# Patient Record
Sex: FEMALE | Race: WHITE | NOT HISPANIC OR LATINO
[De-identification: names, ages, dates, MRNs, and addresses within clinical notes are randomized per-mention and may not be internally consistent; named-entity substitution may affect disease eponyms.]

---

## 2017-01-12 ENCOUNTER — RECORDS - HEALTHEAST (OUTPATIENT)
Dept: ADMINISTRATIVE | Facility: OTHER | Age: 62
End: 2017-01-12

## 2018-05-22 ENCOUNTER — RECORDS - HEALTHEAST (OUTPATIENT)
Dept: ADMINISTRATIVE | Facility: OTHER | Age: 63
End: 2018-05-22

## 2019-03-25 ENCOUNTER — RECORDS - HEALTHEAST (OUTPATIENT)
Dept: ADMINISTRATIVE | Facility: OTHER | Age: 64
End: 2019-03-25

## 2019-04-05 ENCOUNTER — RECORDS - HEALTHEAST (OUTPATIENT)
Dept: ADMINISTRATIVE | Facility: OTHER | Age: 64
End: 2019-04-05

## 2019-09-16 ENCOUNTER — RECORDS - HEALTHEAST (OUTPATIENT)
Dept: ADMINISTRATIVE | Facility: OTHER | Age: 64
End: 2019-09-16

## 2019-09-16 LAB
CHOLEST SERPL-MCNC: 151 MG/DL (ref 0–199)
HDLC SERPL-MCNC: 51 MG/DL
LDLC SERPL CALC-MCNC: 73 MG/DL
NON HDL CHOL. (LDL+VLDL): 100 MG/DL
TRIGLYCERIDES (HISTORICAL CONVERSION): 135 MG/DL

## 2019-09-24 ENCOUNTER — RECORDS - HEALTHEAST (OUTPATIENT)
Dept: ADMINISTRATIVE | Facility: OTHER | Age: 64
End: 2019-09-24

## 2019-09-25 ENCOUNTER — AMBULATORY - HEALTHEAST (OUTPATIENT)
Dept: MULTI SPECIALTY CLINIC | Facility: CLINIC | Age: 64
End: 2019-09-25

## 2019-09-25 ENCOUNTER — RECORDS - HEALTHEAST (OUTPATIENT)
Dept: ADMINISTRATIVE | Facility: OTHER | Age: 64
End: 2019-09-25

## 2019-09-27 LAB — OCCULT BLOOD (FIT)_EXT (HISTORICAL CONVERSION): NEGATIVE

## 2019-10-18 ENCOUNTER — RECORDS - HEALTHEAST (OUTPATIENT)
Dept: ADMINISTRATIVE | Facility: OTHER | Age: 64
End: 2019-10-18

## 2019-10-23 ENCOUNTER — RECORDS - HEALTHEAST (OUTPATIENT)
Dept: ADMINISTRATIVE | Facility: OTHER | Age: 64
End: 2019-10-23

## 2019-10-23 LAB
HPV_EXT - HISTORICAL: NORMAL
PAP SMEAR - HIM PATIENT REPORTED: NORMAL

## 2019-10-24 ENCOUNTER — RECORDS - HEALTHEAST (OUTPATIENT)
Dept: ADMINISTRATIVE | Facility: OTHER | Age: 64
End: 2019-10-24

## 2019-11-04 ENCOUNTER — RECORDS - HEALTHEAST (OUTPATIENT)
Dept: ADMINISTRATIVE | Facility: OTHER | Age: 64
End: 2019-11-04

## 2019-12-02 ENCOUNTER — RECORDS - HEALTHEAST (OUTPATIENT)
Dept: ADMINISTRATIVE | Facility: OTHER | Age: 64
End: 2019-12-02

## 2019-12-19 ENCOUNTER — RECORDS - HEALTHEAST (OUTPATIENT)
Dept: ADMINISTRATIVE | Facility: OTHER | Age: 64
End: 2019-12-19

## 2019-12-22 ENCOUNTER — RECORDS - HEALTHEAST (OUTPATIENT)
Dept: ADMINISTRATIVE | Facility: OTHER | Age: 64
End: 2019-12-22

## 2020-01-02 ENCOUNTER — OFFICE VISIT - HEALTHEAST (OUTPATIENT)
Dept: FAMILY MEDICINE | Facility: CLINIC | Age: 65
End: 2020-01-02

## 2020-01-02 ENCOUNTER — COMMUNICATION - HEALTHEAST (OUTPATIENT)
Dept: TELEHEALTH | Facility: CLINIC | Age: 65
End: 2020-01-02

## 2020-01-02 DIAGNOSIS — I10 ESSENTIAL HYPERTENSION: ICD-10-CM

## 2020-01-02 DIAGNOSIS — G89.18 POSTOPERATIVE PAIN: ICD-10-CM

## 2020-01-02 DIAGNOSIS — E03.9 ACQUIRED HYPOTHYROIDISM: ICD-10-CM

## 2020-01-02 DIAGNOSIS — C34.12 MALIGNANT NEOPLASM OF UPPER LOBE OF LEFT LUNG (H): ICD-10-CM

## 2020-01-02 RX ORDER — HYDROCHLOROTHIAZIDE 25 MG/1
25 TABLET ORAL DAILY
Status: SHIPPED | COMMUNITY
Start: 2019-01-11

## 2020-01-02 RX ORDER — LEVOTHYROXINE SODIUM 125 UG/1
TABLET ORAL
Status: SHIPPED | COMMUNITY
Start: 2019-07-17

## 2020-01-02 RX ORDER — IPRATROPIUM BROMIDE 21 UG/1
2 SPRAY, METERED NASAL
Status: SHIPPED | COMMUNITY
Start: 2019-05-29

## 2020-01-02 RX ORDER — IBUPROFEN 200 MG
200-400 TABLET ORAL EVERY 8 HOURS PRN
Status: SHIPPED | COMMUNITY
Start: 2020-01-02

## 2020-01-02 ASSESSMENT — ANXIETY QUESTIONNAIRES
2. NOT BEING ABLE TO STOP OR CONTROL WORRYING: NOT AT ALL
1. FEELING NERVOUS, ANXIOUS, OR ON EDGE: NOT AT ALL
5. BEING SO RESTLESS THAT IT IS HARD TO SIT STILL: NOT AT ALL
4. TROUBLE RELAXING: NOT AT ALL
7. FEELING AFRAID AS IF SOMETHING AWFUL MIGHT HAPPEN: NOT AT ALL
GAD7 TOTAL SCORE: 0
6. BECOMING EASILY ANNOYED OR IRRITABLE: NOT AT ALL
3. WORRYING TOO MUCH ABOUT DIFFERENT THINGS: NOT AT ALL

## 2020-01-02 ASSESSMENT — PATIENT HEALTH QUESTIONNAIRE - PHQ9: SUM OF ALL RESPONSES TO PHQ QUESTIONS 1-9: 0

## 2020-01-02 ASSESSMENT — MIFFLIN-ST. JEOR: SCORE: 1336.14

## 2020-01-03 ENCOUNTER — COMMUNICATION - HEALTHEAST (OUTPATIENT)
Dept: ONCOLOGY | Facility: CLINIC | Age: 65
End: 2020-01-03

## 2020-01-13 ENCOUNTER — COMMUNICATION - HEALTHEAST (OUTPATIENT)
Dept: FAMILY MEDICINE | Facility: CLINIC | Age: 65
End: 2020-01-13

## 2020-01-13 DIAGNOSIS — C34.12 MALIGNANT NEOPLASM OF UPPER LOBE OF LEFT LUNG (H): ICD-10-CM

## 2020-01-15 ENCOUNTER — AMBULATORY - HEALTHEAST (OUTPATIENT)
Dept: ONCOLOGY | Facility: CLINIC | Age: 65
End: 2020-01-15

## 2020-01-15 ENCOUNTER — OFFICE VISIT - HEALTHEAST (OUTPATIENT)
Dept: ONCOLOGY | Facility: CLINIC | Age: 65
End: 2020-01-15

## 2020-01-15 DIAGNOSIS — J41.0 SIMPLE CHRONIC BRONCHITIS (H): ICD-10-CM

## 2020-01-15 DIAGNOSIS — C34.12 PRIMARY CANCER OF LEFT UPPER LOBE OF LUNG (H): ICD-10-CM

## 2020-01-15 DIAGNOSIS — Z71.6 ENCOUNTER FOR TOBACCO USE CESSATION COUNSELING: ICD-10-CM

## 2020-01-15 RX ORDER — CHLORAL HYDRATE 500 MG
2 CAPSULE ORAL DAILY
Status: SHIPPED | COMMUNITY
Start: 2020-01-15

## 2020-01-15 ASSESSMENT — MIFFLIN-ST. JEOR: SCORE: 1345.1

## 2020-01-16 ENCOUNTER — RECORDS - HEALTHEAST (OUTPATIENT)
Dept: HEALTH INFORMATION MANAGEMENT | Facility: CLINIC | Age: 65
End: 2020-01-16

## 2020-01-28 ENCOUNTER — AMBULATORY - HEALTHEAST (OUTPATIENT)
Dept: INTENSIVE CARE | Facility: CLINIC | Age: 65
End: 2020-01-28

## 2020-01-28 DIAGNOSIS — C34.12 PRIMARY CANCER OF LEFT UPPER LOBE OF LUNG (H): ICD-10-CM

## 2020-01-28 DIAGNOSIS — J41.0 SIMPLE CHRONIC BRONCHITIS (H): ICD-10-CM

## 2020-01-30 ENCOUNTER — OFFICE VISIT - HEALTHEAST (OUTPATIENT)
Dept: PULMONOLOGY | Facility: OTHER | Age: 65
End: 2020-01-30

## 2020-01-30 DIAGNOSIS — J44.9 COPD, GROUP A, BY GOLD 2017 CLASSIFICATION (H): ICD-10-CM

## 2020-01-30 DIAGNOSIS — F17.200 TOBACCO DEPENDENCE SYNDROME: ICD-10-CM

## 2020-01-30 ASSESSMENT — MIFFLIN-ST. JEOR: SCORE: 1334.32

## 2020-02-06 ENCOUNTER — OFFICE VISIT - HEALTHEAST (OUTPATIENT)
Dept: FAMILY MEDICINE | Facility: CLINIC | Age: 65
End: 2020-02-06

## 2020-02-06 DIAGNOSIS — C34.12 PRIMARY CANCER OF LEFT UPPER LOBE OF LUNG (H): ICD-10-CM

## 2020-02-06 DIAGNOSIS — R07.89 CHEST WALL PAIN: ICD-10-CM

## 2020-02-06 DIAGNOSIS — E78.2 MIXED HYPERLIPIDEMIA: ICD-10-CM

## 2020-02-06 ASSESSMENT — MIFFLIN-ST. JEOR: SCORE: 1336.93

## 2020-02-07 ENCOUNTER — COMMUNICATION - HEALTHEAST (OUTPATIENT)
Dept: FAMILY MEDICINE | Facility: CLINIC | Age: 65
End: 2020-02-07

## 2020-02-07 DIAGNOSIS — R07.89 CHEST WALL PAIN: ICD-10-CM

## 2020-03-17 ENCOUNTER — COMMUNICATION - HEALTHEAST (OUTPATIENT)
Dept: FAMILY MEDICINE | Facility: CLINIC | Age: 65
End: 2020-03-17

## 2020-03-17 DIAGNOSIS — C34.12 MALIGNANT NEOPLASM OF UPPER LOBE OF LEFT LUNG (H): ICD-10-CM

## 2020-04-13 ENCOUNTER — AMBULATORY - HEALTHEAST (OUTPATIENT)
Dept: FAMILY MEDICINE | Facility: CLINIC | Age: 65
End: 2020-04-13

## 2020-04-20 ENCOUNTER — OFFICE VISIT - HEALTHEAST (OUTPATIENT)
Dept: PULMONOLOGY | Facility: OTHER | Age: 65
End: 2020-04-20

## 2020-04-20 DIAGNOSIS — J41.0 SIMPLE CHRONIC BRONCHITIS (H): ICD-10-CM

## 2020-04-20 ASSESSMENT — MIFFLIN-ST. JEOR: SCORE: 1316.07

## 2020-05-22 ENCOUNTER — AMBULATORY - HEALTHEAST (OUTPATIENT)
Dept: PULMONOLOGY | Facility: OTHER | Age: 65
End: 2020-05-22

## 2020-05-22 DIAGNOSIS — J44.9 COPD, GROUP A, BY GOLD 2017 CLASSIFICATION (H): ICD-10-CM

## 2020-06-02 ENCOUNTER — AMBULATORY - HEALTHEAST (OUTPATIENT)
Dept: PULMONOLOGY | Facility: OTHER | Age: 65
End: 2020-06-02

## 2020-06-02 DIAGNOSIS — J44.9 COPD, GROUP A, BY GOLD 2017 CLASSIFICATION (H): ICD-10-CM

## 2020-07-13 ENCOUNTER — AMBULATORY - HEALTHEAST (OUTPATIENT)
Dept: INFUSION THERAPY | Facility: CLINIC | Age: 65
End: 2020-07-13

## 2020-07-13 ENCOUNTER — HOSPITAL ENCOUNTER (OUTPATIENT)
Dept: CT IMAGING | Facility: CLINIC | Age: 65
Setting detail: RADIATION/ONCOLOGY SERIES
Discharge: STILL ADMITTED OR ADMINISTRATIVE | End: 2020-07-13
Attending: INTERNAL MEDICINE

## 2020-07-13 DIAGNOSIS — C34.12 PRIMARY CANCER OF LEFT UPPER LOBE OF LUNG (H): ICD-10-CM

## 2020-07-13 LAB
ALBUMIN SERPL-MCNC: 3.9 G/DL (ref 3.5–5)
ALP SERPL-CCNC: 81 U/L (ref 45–120)
ALT SERPL W P-5'-P-CCNC: 28 U/L (ref 0–45)
ANION GAP SERPL CALCULATED.3IONS-SCNC: 10 MMOL/L (ref 5–18)
AST SERPL W P-5'-P-CCNC: 22 U/L (ref 0–40)
BASOPHILS # BLD AUTO: 0.1 THOU/UL (ref 0–0.2)
BASOPHILS NFR BLD AUTO: 1 % (ref 0–2)
BILIRUB SERPL-MCNC: 0.4 MG/DL (ref 0–1)
BUN SERPL-MCNC: 13 MG/DL (ref 8–22)
CALCIUM SERPL-MCNC: 9.4 MG/DL (ref 8.5–10.5)
CHLORIDE BLD-SCNC: 105 MMOL/L (ref 98–107)
CO2 SERPL-SCNC: 27 MMOL/L (ref 22–31)
CREAT SERPL-MCNC: 0.89 MG/DL (ref 0.6–1.1)
EOSINOPHIL # BLD AUTO: 0.2 THOU/UL (ref 0–0.4)
EOSINOPHIL NFR BLD AUTO: 2 % (ref 0–6)
ERYTHROCYTE [DISTWIDTH] IN BLOOD BY AUTOMATED COUNT: 14.2 % (ref 11–14.5)
GFR SERPL CREATININE-BSD FRML MDRD: >60 ML/MIN/1.73M2
GLUCOSE BLD-MCNC: 94 MG/DL (ref 70–125)
HCT VFR BLD AUTO: 43.8 % (ref 35–47)
HGB BLD-MCNC: 14.5 G/DL (ref 12–16)
LYMPHOCYTES # BLD AUTO: 2 THOU/UL (ref 0.8–4.4)
LYMPHOCYTES NFR BLD AUTO: 25 % (ref 20–40)
MCH RBC QN AUTO: 29.8 PG (ref 27–34)
MCHC RBC AUTO-ENTMCNC: 33.1 G/DL (ref 32–36)
MCV RBC AUTO: 90 FL (ref 80–100)
MONOCYTES # BLD AUTO: 0.5 THOU/UL (ref 0–0.9)
MONOCYTES NFR BLD AUTO: 6 % (ref 2–10)
NEUTROPHILS # BLD AUTO: 5.2 THOU/UL (ref 2–7.7)
NEUTROPHILS NFR BLD AUTO: 65 % (ref 50–70)
PLATELET # BLD AUTO: 256 THOU/UL (ref 140–440)
PMV BLD AUTO: 10.6 FL (ref 8.5–12.5)
POTASSIUM BLD-SCNC: 3.6 MMOL/L (ref 3.5–5)
PROT SERPL-MCNC: 7.2 G/DL (ref 6–8)
RBC # BLD AUTO: 4.86 MILL/UL (ref 3.8–5.4)
SODIUM SERPL-SCNC: 142 MMOL/L (ref 136–145)
WBC: 8 THOU/UL (ref 4–11)

## 2020-07-15 ENCOUNTER — OFFICE VISIT - HEALTHEAST (OUTPATIENT)
Dept: ONCOLOGY | Facility: CLINIC | Age: 65
End: 2020-07-15

## 2020-07-15 DIAGNOSIS — C34.12 PRIMARY CANCER OF LEFT UPPER LOBE OF LUNG (H): ICD-10-CM

## 2020-07-15 DIAGNOSIS — Z71.6 TOBACCO ABUSE COUNSELING: ICD-10-CM

## 2020-07-15 DIAGNOSIS — Z12.31 ENCOUNTER FOR SCREENING MAMMOGRAM FOR MALIGNANT NEOPLASM OF BREAST: ICD-10-CM

## 2020-07-16 ENCOUNTER — COMMUNICATION - HEALTHEAST (OUTPATIENT)
Dept: ONCOLOGY | Facility: CLINIC | Age: 65
End: 2020-07-16

## 2020-07-27 ENCOUNTER — COMMUNICATION - HEALTHEAST (OUTPATIENT)
Dept: ONCOLOGY | Facility: CLINIC | Age: 65
End: 2020-07-27

## 2020-08-08 ENCOUNTER — COMMUNICATION - HEALTHEAST (OUTPATIENT)
Dept: FAMILY MEDICINE | Facility: CLINIC | Age: 65
End: 2020-08-08

## 2020-08-08 DIAGNOSIS — E03.9 ACQUIRED HYPOTHYROIDISM: ICD-10-CM

## 2020-08-18 ENCOUNTER — COMMUNICATION - HEALTHEAST (OUTPATIENT)
Dept: ADMINISTRATIVE | Facility: CLINIC | Age: 65
End: 2020-08-18

## 2020-09-18 ENCOUNTER — OFFICE VISIT - HEALTHEAST (OUTPATIENT)
Dept: FAMILY MEDICINE | Facility: CLINIC | Age: 65
End: 2020-09-18

## 2020-09-18 DIAGNOSIS — Z23 NEED FOR TETANUS BOOSTER: ICD-10-CM

## 2020-09-18 DIAGNOSIS — C34.12 PRIMARY CANCER OF LEFT UPPER LOBE OF LUNG (H): ICD-10-CM

## 2020-09-18 DIAGNOSIS — M54.42 CHRONIC RIGHT-SIDED LOW BACK PAIN WITH LEFT-SIDED SCIATICA: ICD-10-CM

## 2020-09-18 DIAGNOSIS — G89.29 CHRONIC RIGHT-SIDED LOW BACK PAIN WITH LEFT-SIDED SCIATICA: ICD-10-CM

## 2020-09-18 DIAGNOSIS — I10 ESSENTIAL HYPERTENSION: ICD-10-CM

## 2020-09-18 DIAGNOSIS — Z00.00 ROUTINE MEDICAL EXAM: ICD-10-CM

## 2020-09-18 DIAGNOSIS — Z78.0 ASYMPTOMATIC MENOPAUSAL STATE: ICD-10-CM

## 2020-09-18 DIAGNOSIS — Z72.0 TOBACCO USE: ICD-10-CM

## 2020-09-18 DIAGNOSIS — E03.9 ACQUIRED HYPOTHYROIDISM: ICD-10-CM

## 2020-09-18 LAB
ALBUMIN SERPL-MCNC: 4 G/DL (ref 3.5–5)
ALP SERPL-CCNC: 94 U/L (ref 45–120)
ALT SERPL W P-5'-P-CCNC: 28 U/L (ref 0–45)
ANION GAP SERPL CALCULATED.3IONS-SCNC: 8 MMOL/L (ref 5–18)
AST SERPL W P-5'-P-CCNC: 20 U/L (ref 0–40)
BILIRUB SERPL-MCNC: 0.4 MG/DL (ref 0–1)
BUN SERPL-MCNC: 14 MG/DL (ref 8–22)
CALCIUM SERPL-MCNC: 9.9 MG/DL (ref 8.5–10.5)
CHLORIDE BLD-SCNC: 101 MMOL/L (ref 98–107)
CHOLEST SERPL-MCNC: 166 MG/DL
CO2 SERPL-SCNC: 31 MMOL/L (ref 22–31)
CREAT SERPL-MCNC: 0.93 MG/DL (ref 0.6–1.1)
FASTING STATUS PATIENT QL REPORTED: YES
GFR SERPL CREATININE-BSD FRML MDRD: 60 ML/MIN/1.73M2
GLUCOSE BLD-MCNC: 105 MG/DL (ref 70–125)
HDLC SERPL-MCNC: 44 MG/DL
LDLC SERPL CALC-MCNC: 85 MG/DL
POTASSIUM BLD-SCNC: 4 MMOL/L (ref 3.5–5)
PROT SERPL-MCNC: 7.1 G/DL (ref 6–8)
SODIUM SERPL-SCNC: 140 MMOL/L (ref 136–145)
TRIGL SERPL-MCNC: 184 MG/DL
TSH SERPL DL<=0.005 MIU/L-ACNC: 1.99 UIU/ML (ref 0.3–5)

## 2020-09-18 ASSESSMENT — PATIENT HEALTH QUESTIONNAIRE - PHQ9: SUM OF ALL RESPONSES TO PHQ QUESTIONS 1-9: 1

## 2020-09-18 ASSESSMENT — MIFFLIN-ST. JEOR: SCORE: 1337.39

## 2020-09-25 ENCOUNTER — HOSPITAL ENCOUNTER (OUTPATIENT)
Dept: MRI IMAGING | Facility: CLINIC | Age: 65
Discharge: HOME | End: 2020-09-25
Attending: FAMILY MEDICINE

## 2020-09-25 DIAGNOSIS — G89.29 CHRONIC RIGHT-SIDED LOW BACK PAIN WITH LEFT-SIDED SCIATICA: ICD-10-CM

## 2020-09-25 DIAGNOSIS — M54.42 CHRONIC RIGHT-SIDED LOW BACK PAIN WITH LEFT-SIDED SCIATICA: ICD-10-CM

## 2020-10-15 ENCOUNTER — COMMUNICATION - HEALTHEAST (OUTPATIENT)
Dept: PULMONOLOGY | Facility: OTHER | Age: 65
End: 2020-10-15

## 2020-10-16 ENCOUNTER — COMMUNICATION - HEALTHEAST (OUTPATIENT)
Dept: FAMILY MEDICINE | Facility: CLINIC | Age: 65
End: 2020-10-16

## 2020-10-16 DIAGNOSIS — J41.0 SIMPLE CHRONIC BRONCHITIS (H): ICD-10-CM

## 2020-10-16 RX ORDER — ALBUTEROL SULFATE 90 UG/1
AEROSOL, METERED RESPIRATORY (INHALATION)
Qty: 54 G | Refills: 3 | Status: SHIPPED | OUTPATIENT
Start: 2020-10-16

## 2020-10-19 ENCOUNTER — AMBULATORY - HEALTHEAST (OUTPATIENT)
Dept: INTENSIVE CARE | Facility: CLINIC | Age: 65
End: 2020-10-19

## 2020-10-19 DIAGNOSIS — J44.9 COPD, GROUP A, BY GOLD 2017 CLASSIFICATION (H): ICD-10-CM

## 2020-10-19 RX ORDER — TIOTROPIUM BROMIDE 18 UG/1
18 CAPSULE ORAL; RESPIRATORY (INHALATION) DAILY
Qty: 90 CAPSULE | Refills: 3 | Status: SHIPPED | OUTPATIENT
Start: 2020-10-19

## 2020-11-03 ENCOUNTER — AMBULATORY - HEALTHEAST (OUTPATIENT)
Dept: FAMILY MEDICINE | Facility: CLINIC | Age: 65
End: 2020-11-03

## 2020-11-03 ENCOUNTER — COMMUNICATION - HEALTHEAST (OUTPATIENT)
Dept: FAMILY MEDICINE | Facility: CLINIC | Age: 65
End: 2020-11-03

## 2020-11-03 DIAGNOSIS — Z20.822 EXPOSURE TO 2019 NOVEL CORONAVIRUS: ICD-10-CM

## 2020-11-07 ENCOUNTER — AMBULATORY - HEALTHEAST (OUTPATIENT)
Dept: FAMILY MEDICINE | Facility: CLINIC | Age: 65
End: 2020-11-07

## 2020-11-07 DIAGNOSIS — Z20.822 EXPOSURE TO 2019 NOVEL CORONAVIRUS: ICD-10-CM

## 2020-11-09 ENCOUNTER — COMMUNICATION - HEALTHEAST (OUTPATIENT)
Dept: SCHEDULING | Facility: CLINIC | Age: 65
End: 2020-11-09

## 2020-11-11 ENCOUNTER — COMMUNICATION - HEALTHEAST (OUTPATIENT)
Dept: FAMILY MEDICINE | Facility: CLINIC | Age: 65
End: 2020-11-11

## 2020-11-11 DIAGNOSIS — C34.12 MALIGNANT NEOPLASM OF UPPER LOBE OF LEFT LUNG (H): ICD-10-CM

## 2020-11-13 RX ORDER — GABAPENTIN 300 MG/1
900 CAPSULE ORAL 3 TIMES DAILY
Qty: 270 CAPSULE | Refills: 3 | Status: SHIPPED | OUTPATIENT
Start: 2020-11-13

## 2020-12-23 ENCOUNTER — COMMUNICATION - HEALTHEAST (OUTPATIENT)
Dept: FAMILY MEDICINE | Facility: CLINIC | Age: 65
End: 2020-12-23

## 2020-12-23 DIAGNOSIS — E78.2 MIXED HYPERLIPIDEMIA: ICD-10-CM

## 2020-12-26 RX ORDER — ATORVASTATIN CALCIUM 20 MG/1
20 TABLET, FILM COATED ORAL DAILY
Qty: 90 TABLET | Refills: 2 | Status: SHIPPED | OUTPATIENT
Start: 2020-12-26 | End: 2021-10-13

## 2020-12-28 ENCOUNTER — RECORDS - HEALTHEAST (OUTPATIENT)
Dept: BONE DENSITY | Facility: CLINIC | Age: 65
End: 2020-12-28

## 2020-12-28 ENCOUNTER — RECORDS - HEALTHEAST (OUTPATIENT)
Dept: RADIOLOGY | Facility: CLINIC | Age: 65
End: 2020-12-28

## 2020-12-28 ENCOUNTER — HOSPITAL ENCOUNTER (OUTPATIENT)
Dept: MAMMOGRAPHY | Facility: CLINIC | Age: 65
Setting detail: RADIATION/ONCOLOGY SERIES
Discharge: STILL ADMITTED OR ADMINISTRATIVE | End: 2020-12-28
Attending: INTERNAL MEDICINE

## 2020-12-28 DIAGNOSIS — C34.12 PRIMARY CANCER OF LEFT UPPER LOBE OF LUNG (H): ICD-10-CM

## 2020-12-28 DIAGNOSIS — Z78.0 ASYMPTOMATIC MENOPAUSAL STATE: ICD-10-CM

## 2020-12-28 DIAGNOSIS — E03.9 HYPOTHYROIDISM, UNSPECIFIED: ICD-10-CM

## 2020-12-28 DIAGNOSIS — Z12.31 ENCOUNTER FOR SCREENING MAMMOGRAM FOR MALIGNANT NEOPLASM OF BREAST: ICD-10-CM

## 2020-12-29 ENCOUNTER — COMMUNICATION - HEALTHEAST (OUTPATIENT)
Dept: FAMILY MEDICINE | Facility: CLINIC | Age: 65
End: 2020-12-29

## 2020-12-29 ENCOUNTER — RECORDS - HEALTHEAST (OUTPATIENT)
Dept: ADMINISTRATIVE | Facility: OTHER | Age: 65
End: 2020-12-29

## 2021-01-01 ENCOUNTER — RECORDS - HEALTHEAST (OUTPATIENT)
Dept: ADMINISTRATIVE | Facility: OTHER | Age: 66
End: 2021-01-01

## 2021-05-27 ENCOUNTER — RECORDS - HEALTHEAST (OUTPATIENT)
Dept: ADMINISTRATIVE | Facility: CLINIC | Age: 66
End: 2021-05-27

## 2021-05-27 ASSESSMENT — PATIENT HEALTH QUESTIONNAIRE - PHQ9
SUM OF ALL RESPONSES TO PHQ QUESTIONS 1-9: 1
SUM OF ALL RESPONSES TO PHQ QUESTIONS 1-9: 0

## 2021-05-28 ASSESSMENT — ANXIETY QUESTIONNAIRES: GAD7 TOTAL SCORE: 0

## 2021-06-04 VITALS
HEIGHT: 65 IN | DIASTOLIC BLOOD PRESSURE: 70 MMHG | WEIGHT: 178.4 LBS | OXYGEN SATURATION: 94 % | HEART RATE: 99 BPM | BODY MASS INDEX: 29.72 KG/M2 | SYSTOLIC BLOOD PRESSURE: 135 MMHG | TEMPERATURE: 97.7 F

## 2021-06-04 VITALS
HEIGHT: 65 IN | WEIGHT: 177.3 LBS | SYSTOLIC BLOOD PRESSURE: 122 MMHG | BODY MASS INDEX: 29.54 KG/M2 | DIASTOLIC BLOOD PRESSURE: 78 MMHG | HEART RATE: 102 BPM | OXYGEN SATURATION: 93 %

## 2021-06-04 VITALS
BODY MASS INDEX: 29.42 KG/M2 | WEIGHT: 176.6 LBS | SYSTOLIC BLOOD PRESSURE: 122 MMHG | HEIGHT: 65 IN | OXYGEN SATURATION: 97 % | TEMPERATURE: 97.5 F | DIASTOLIC BLOOD PRESSURE: 88 MMHG | HEART RATE: 84 BPM

## 2021-06-04 VITALS
HEIGHT: 65 IN | RESPIRATION RATE: 24 BRPM | HEART RATE: 106 BPM | BODY MASS INDEX: 29.47 KG/M2 | SYSTOLIC BLOOD PRESSURE: 128 MMHG | WEIGHT: 176.9 LBS | DIASTOLIC BLOOD PRESSURE: 72 MMHG | OXYGEN SATURATION: 97 %

## 2021-06-04 VITALS — BODY MASS INDEX: 28.66 KG/M2 | WEIGHT: 172 LBS | HEIGHT: 65 IN

## 2021-06-04 VITALS
DIASTOLIC BLOOD PRESSURE: 78 MMHG | WEIGHT: 180.7 LBS | BODY MASS INDEX: 30.07 KG/M2 | HEART RATE: 94 BPM | SYSTOLIC BLOOD PRESSURE: 138 MMHG | OXYGEN SATURATION: 94 % | TEMPERATURE: 98.4 F

## 2021-06-04 NOTE — PROGRESS NOTES
ASSESSMENT:  1. Malignant neoplasm of upper lobe of left lung (H)    Looking into this in the short-term, she seems to be doing well after the surgery.  She is having some postoperative pain which we will discuss below.  She is feeling better and her breathing is returning to somewhat normal.  She is not having fevers or chills or sweats.    I will refer her to our oncology team to get her in relatively soon so she can achieve a continuity of care and quick way.  The timing of this was obviously unfortunate, but we will get her set up with our oncologist and get her taken care of in a great way by them very quickly.    If she needs anything else in the short-term she will let us know here otherwise she will plan to follow with the oncologist going forward about this.      - Ambulatory referral to Oncology/Hematology Adult  - gabapentin (NEURONTIN) 300 MG capsule; Take 1 capsule (300 mg total) by mouth 3 (three) times a day.  Dispense: 90 capsule; Refill: 3    2. Acquired hypothyroidism    She is stable on her thyroid medication right now does not need a refill.  She certainly welcome to come on back or let me know when she is due for refills on this.        3. Essential hypertension    Same with the blood pressure in that she is doing well right now on the blood pressure medications.  She will let us know if she needs refills going forward we should probably check in with her on an every six-month basis for that.        4. Postoperative pain    Since the narcotic pain medication was not really suiting her very well and the over-the-counter medications are not helping either, this may actually be a bit of nerve pain so we will try some gabapentin at 300 mg up to 3 times a day as needed.  She can start at a lower dose and work her way up.  We can follow-up with her on this plan as well going forward.                PLAN:  There are no Patient Instructions on file for this visit.    Orders Placed This Encounter    Procedures     Ambulatory referral to Oncology/Hematology Adult     Referral Priority:   Routine     Referral Type:   Consultation     Referral Reason:   Evaluation and Treatment     Requested Specialty:   Oncology     Number of Visits Requested:   1     There are no discontinued medications.    No follow-ups on file.    CHIEF COMPLAINT:  Chief Complaint   Patient presents with     Establish Care     Post op for lung operation on 12/19/19 - would like to discuss pain management.        HISTORY OF PRESENT ILLNESS:  Mary is a 64 y.o. female presenting to the clinic today new patient to our clinic who has the unfortunate circumstance to have been recently diagnosed with a lung cancer in her left upper lobe.  She was on a different insurance and seeing her physician in a different system, and she actually had a screening CT done which unfortunately showed a nodule which led them to do a PET scan which showed uptake so she was scheduled for a partial lung resection.  She had this done a couple of weeks ago and unfortunately it did confirm the acinar type of lung cancer in her left upper lobe.  All of her nodes were negative and it was felt that she could be very optimistic that this was a surgical cure.  However her insurance changed on 1 January and she needs to establish with a different provider and she is here to do that with me today and she needs to get in with a oncologist.    From the surgery perspective, she is doing pretty well.  She does have some postoperative pain mostly around the incision.  He is tried over-the-counter medications and even tried some oxycodone but she did not really think that helped.  Is more of a burning gnawing type of pain and she is wondering if something like gabapentin might be beneficial for her.    She has hypothyroidism and hypertension and we reviewed the rest of her medical history as denoted in the chart.    REVIEW OF SYSTEMS:     All other systems are  "negative.    PFSH:    Reviewed    Patient is new patient to the clinic. Please see past medical history, surgical history, social history and family history, all of which were completed in their entirety today.     TOBACCO USE:  Social History     Tobacco Use   Smoking Status Never Smoker   Smokeless Tobacco Former User       VITALS:  Vitals:    01/02/20 1324   BP: 122/78   Patient Site: Left Arm   Patient Position: Sitting   Cuff Size: Adult Regular   Pulse: (!) 102   SpO2: 93%   Weight: 177 lb 4.8 oz (80.4 kg)   Height: 5' 4.75\" (1.645 m)     Wt Readings from Last 3 Encounters:   01/02/20 177 lb 4.8 oz (80.4 kg)     Body mass index is 29.73 kg/m .    PHYSICAL EXAM:  Constitutional:  Well appearing patient in no acute distress.  Vitals:  Per nursing notes.    HEENT:  Normocephalic, atraumatic.  Ears are clear bilaterally, with no fluid or redness, and landmarks visible.  Pupils are equal and reactive to light, extraocular muscles intact, visual fields are full.  Nose is normal, and oropharynx is clear without redness.    Neck is without lymphadenopathy.    Lungs: She has a bit of decreased breath sounds on that left side but overall sounds pretty good.  The surgical scar on the left flank area is healing up nicely.  There is no redness or drainage from it.   Cardiac:  Regular rate and rhythm without murmurs, rubs, or gallops.     Legs show no cyanosis, clubbing or edema.  Palpation of the distal pulses are normal and symmetric.    Neurologic:  Cranial nerves II-XII intact.   Normal and symmetric reflexes in extremities, with normal strength and sensation.  Psychiatric:  Mood appropriate, memory intact.       ADDITIONAL HISTORY SUMMARIZED (2): reviewed records  CARE EVERYWHERE/ EXTRA INFORMATION (1): reviewed CE   RADIOLOGY TESTS (1): reviewed CXRs, PET scans  LABS (1): reviewed labs  CARDIOLOGY/MEDICINE TESTS (1):   INDEPENDENT REVIEW (2 each): None.     Total data points:5      MEDICATIONS:  Current Outpatient " Medications   Medication Sig Dispense Refill     acetaminophen (TYLENOL) 325 MG tablet Take 325-650 mg by mouth.       albuterol (PROAIR HFA;PROVENTIL HFA;VENTOLIN HFA) 90 mcg/actuation inhaler INHALE 2 TO 4 PUFFS BY MOUTH EVERY 4 HOURS AS NEEDED. MAX OF 12 PUFFS PER DAY.       aspirin 81 MG EC tablet Take 81 mg by mouth.       atorvastatin (LIPITOR) 20 MG tablet Take 20 mg by mouth.       BIOTIN ORAL        calcium carb/vit D3/minerals (CALCIUM-VITAMIN D ORAL)        fluticasone propionate (FLONASE) 50 mcg/actuation nasal spray 2 sprays.       hydroCHLOROthiazide (HYDRODIURIL) 25 MG tablet Take 25 mg by mouth daily.       ibuprofen (ADVIL,MOTRIN) 200 MG tablet Take 200-400 mg by mouth.       ipratropium (ATROVENT) 0.03 % nasal spray 2 sprays.       levothyroxine (SYNTHROID, LEVOTHROID) 125 MCG tablet TAKE 1 TABLET EVERY DAY       metroNIDAZOLE (METROGEL) 0.75 % gel Apple twice daily to face       gabapentin (NEURONTIN) 300 MG capsule Take 1 capsule (300 mg total) by mouth 3 (three) times a day. 90 capsule 3     No current facility-administered medications for this visit.

## 2021-06-05 VITALS
HEIGHT: 65 IN | OXYGEN SATURATION: 96 % | DIASTOLIC BLOOD PRESSURE: 84 MMHG | BODY MASS INDEX: 29.44 KG/M2 | SYSTOLIC BLOOD PRESSURE: 132 MMHG | HEART RATE: 80 BPM | WEIGHT: 176.7 LBS

## 2021-06-05 NOTE — PROGRESS NOTES
I met with Mary following her consult with Dr. Gerber today.  They had a good visit and she has no further questions following her appt.  We reviewed the plan of care for referral to pulmonology, referral to smoking cessation program, and labs, CT and f/u with Dr. Gerber in 6 months.  I told Mary our office will help schedule future appts prior to her leaving today.   I reviewed my role and the assistance I can provide.  Mary denies a need for additional resources at this time.  She stated she has good family  support with five children, three of which live in [place], 13 grandchildren and great grandchildren as well.  I will continue to follow and I invited calls as well.

## 2021-06-05 NOTE — PATIENT INSTRUCTIONS - HE
It was a pleasure to see you today.    We discussed COPD today, you have moderate COPD.    We will try a new inhaler called spiriva, please use every day, and continues as needed albuterol.  Please quit smoking! If you need any other assistance with this.  You are up to date with vaccinations.      Please call me if this inhaler is too expensive and we will find another option.   I will see you back in 3 months    Olinda Qureshi MD  Pulmonary and Critical Care Medicine  [facility]  Office: [phone]  Pager: [phone]

## 2021-06-05 NOTE — TELEPHONE ENCOUNTER
Reached out to patient and left a message informing her the requested prescription with updated directions has been submitted to the pharmacy. Fanny Rene

## 2021-06-05 NOTE — TELEPHONE ENCOUNTER
Medication Question or Clarification  Who is calling: patient  What medication are you calling about (include dose and sig)?: Percocet  Who prescribed the medication?: Cristobal Burgess MD  What is your question/concern?: Patient is concerned about running out of Percocet.  She is counting taking it every six hours scheduled as she states the relief only last three hours. The other thing is maybe she can have something stronger. She states she has 6 tabs on hand.  Please advise.    Requested Pharmacy: [facility]  Okay to leave a detailed message?: Yes [phone]

## 2021-06-05 NOTE — PROGRESS NOTES
ASSESSMENT:  1. Chest wall pain    I think the likely cause here is of a muscle pull or slight tear from doing a lot of lifting and activity which may be related to her recent surgery.  She may have torn something that was just starting to heal up and that is why it is giving her so much pain.    I do not think this represents a pneumonia or a rib fracture.  I do not think she would benefit from imaging at this time.    I also do not think that it is shingles, but I did tell her that if her rash shows up in the next couple of days she can just let us know and send us a message, and we can get her started on some medication.    I did give her some pain medication as listed below along with a muscle relaxant which I think will help her quite a bit to rest and relax.  If is not getting better after that she will let us know.      - oxyCODONE-acetaminophen (PERCOCET/ENDOCET) 5-325 mg per tablet; Take 1 tablet by mouth every 6 (six) hours as needed for pain.  Dispense: 12 tablet; Refill: 0  - cyclobenzaprine (FLEXERIL) 10 MG tablet; Take 1 tablet (10 mg total) by mouth 3 (three) times a day as needed for muscle spasms.  Dispense: 30 tablet; Refill: 0    2. Primary cancer of left upper lobe of lung (H)      3. Mixed hyperlipidemia    She is asking for refill of atorvastatin today and I did do that for her as well.      - atorvastatin (LIPITOR) 20 MG tablet; Take 1 tablet (20 mg total) by mouth daily.  Dispense: 90 tablet; Refill: 3          PLAN:  There are no Patient Instructions on file for this visit.    No orders of the defined types were placed in this encounter.    Medications Discontinued During This Encounter   Medication Reason     atorvastatin (LIPITOR) 20 MG tablet Reorder       No follow-ups on file.    CHIEF COMPLAINT:  Chief Complaint   Patient presents with     Rash     Lt mid side of back, feels like a pulled muscle, maybe some tingling, taking Gabapentin but is not helping, has been moving since 1/28/20,  "had lung surgery on 12/19/19 and this pain is near incision site. Needs refill of atorvastatin - Last Lipids 9/2019 and were WNL       HISTORY OF PRESENT ILLNESS:  Mary is a 64 y.o. female presenting to the clinic today for some pain that she is having in her left upper back.  She states that she has been hurting now for about a week.  She was been doing some moving and probably lifting more than she should.  She also had a lung lobe resection in the middle of December and now the pain is near the incision site.  She states that she was lifting things that were heavier than she probably should have been lifting.  She cannot really identify 1 particular time where it all started but after moving a couple of days after that, she started noticing pain.  She is concerned about be shingles as this does feel a bit similar to that pain.  She describes it as a sharp burning pain in the left side of her upper to mid back.  It does radiate a bit toward her armpit but also envelops up and down the back and that would obviously cross over several different dermatomes.        REVIEW OF SYSTEMS:     All other systems are negative.    PFSH:      TOBACCO USE:  Social History     Tobacco Use   Smoking Status Current Some Day Smoker     Packs/day: 0.25     Years: 52.00     Pack years: 13.00     Types: Cigarettes   Smokeless Tobacco Never Used       VITALS:  Vitals:    02/06/20 1027   BP: 122/88   Patient Site: Left Arm   Patient Position: Sitting   Cuff Size: Adult Regular   Pulse: 84   Temp: 97.5  F (36.4  C)   SpO2: 97%   Weight: 176 lb 9.6 oz (80.1 kg)   Height: 5' 5\" (1.651 m)     Wt Readings from Last 3 Encounters:   02/06/20 176 lb 9.6 oz (80.1 kg)   01/30/20 176 lb 14.4 oz (80.2 kg)   01/15/20 178 lb 6.4 oz (80.9 kg)     Body mass index is 29.39 kg/m .    PHYSICAL EXAM:  Constitutional:  Well appearing patient in no acute distress.  Vitals:  Per nursing notes.    HEENT:  Normocephalic, atraumatic.  Ears are clear bilaterally, " with no fluid or redness, and landmarks visible.  Pupils are equal and reactive to light, extraocular muscles intact, visual fields are full.  Nose is normal, and oropharynx is clear without redness.    Neck is without lymphadenopathy.    Lungs:  Clear to auscultation bilaterally without wheezes, rales or rhonchi.   Cardiac:  Regular rate and rhythm without murmurs, rubs, or gallops.     No rashes seen on the back, and she has point tenderness in the left paraspinal musculature of the mid back.    Legs show no cyanosis, clubbing or edema.  Palpation of the distal pulses are normal and symmetric.          MEDICATIONS:  Current Outpatient Medications   Medication Sig Dispense Refill     albuterol (PROAIR HFA;PROVENTIL HFA;VENTOLIN HFA) 90 mcg/actuation inhaler INHALE 2 TO 4 PUFFS BY MOUTH EVERY 4 HOURS AS NEEDED. MAX OF 12 PUFFS PER DAY.       aspirin 81 MG EC tablet Take 81 mg by mouth.       atorvastatin (LIPITOR) 20 MG tablet Take 1 tablet (20 mg total) by mouth daily. 90 tablet 3     BIOTIN ORAL        calcium carb/vit D3/minerals (CALCIUM-VITAMIN D ORAL)        fish oil-omega-3 fatty acids 300-1,000 mg capsule Take 2 g by mouth daily.       fluticasone propionate (FLONASE) 50 mcg/actuation nasal spray 2 sprays.       gabapentin (NEURONTIN) 300 MG capsule Take 3 capsules (900 mg total) by mouth 3 (three) times a day. 270 capsule 3     hydroCHLOROthiazide (HYDRODIURIL) 25 MG tablet Take 25 mg by mouth daily.       ibuprofen (ADVIL,MOTRIN) 200 MG tablet Take 200-400 mg by mouth.       ipratropium (ATROVENT) 0.03 % nasal spray 2 sprays.       levothyroxine (SYNTHROID, LEVOTHROID) 125 MCG tablet TAKE 1 TABLET EVERY DAY       metroNIDAZOLE (METROGEL) 0.75 % gel Apple twice daily to face       tiotropium (SPIRIVA) 18 mcg inhalation capsule Place 1 capsule (2 puffs total) into inhaler and inhale daily. Place 1 capsule into the inhaler device. Close and press button to crush the capsule. Inhale the contents of the crushed  capsule in 2 breaths. 1 capsule 6     cyclobenzaprine (FLEXERIL) 10 MG tablet Take 1 tablet (10 mg total) by mouth 3 (three) times a day as needed for muscle spasms. 30 tablet 0     oxyCODONE-acetaminophen (PERCOCET/ENDOCET) 5-325 mg per tablet Take 1 tablet by mouth every 6 (six) hours as needed for pain. 12 tablet 0     No current facility-administered medications for this visit.

## 2021-06-05 NOTE — TELEPHONE ENCOUNTER
Reached out to patient and left a message to return phone call. Please inform patient an updated prescription for Gabapentin has been submitted to her pharmacy. Thank you, Fanny Rene      gabapentin (NEURONTIN) 300 MG capsule 270 capsule 3 1/13/2020     Sig - Route: Take 3 capsules (900 mg total) by mouth 3 (three) times a day. - Oral    Sent to pharmacy as: gabapentin 300 mg capsule (NEURONTIN)    Notes to Pharmacy: This is the correct dose, my last Rx was in error    E-Prescribing Status: Receipt confirmed by pharmacy (1/13/2020 12:07 PM CST)

## 2021-06-05 NOTE — TELEPHONE ENCOUNTER
I changed the Rx so she can take 1 or 2 at a time.    I gave her #20.    Hopefully this will help.  We would need to do further testing if she still has pain into next week.    TS

## 2021-06-05 NOTE — CONSULTS
[facility] Hematology and Oncology Consult Note    Patient: Mary Lang  MRN: [id number]  Date of Service: 01/15/2020        Reason for Visit    I was consulted by Dr. Burgess regarding a left upper lobe lung cancer.    Assessment/Plan    Ms. Mary Lang is a 64 y.o. woman who had a screening CT scan of the chest done on 9/24/2019 because of her longstanding history of smoking.  This showed an approximately 1 cm left upper lobe spiculated lung lesion.  In the subsequent PET CT scan there was low-level uptake in this lesion but no evidence of distant metastasis.  She had a thoracotomy with initially a left upper lobe wedge resection followed by a left upper lobectomy and mediastinal lymphadenectomy done on 12/19/2019.  She has recovered well after the surgery.  I have gone through her past medical records in detail.  I have reviewed her previous medical records from the [facility] system and also from the [facility] through care everywhere.  I have reviewed her old labs.  I have reviewed her pathology reports.  I have reviewed the imaging studies.    1.  I reviewed the pathology results from the lung cancer surgery with the patient in detail.  I explained to her that she had a non-small cell lung cancer, adenocarcinoma type.  With the information that we have in pathology, she had only a stage Ia lung cancer.  She is very glad to hear that.  I discussed with her that at this stage, the large trials have not shown any benefit from adjuvant chemotherapy.  There is no evidence that adjuvant radiation will be helpful also.  Thus I do not think that she needs any adjuvant treatment but she does need follow-up.  I discussed with her that we had to be concerned about about the possibility of recurrence of the same lung cancer and also about the possibility that she may develop a new lung cancer because her risk factors do not change.  She voiced understanding.  I discussed with her that I would recommend  doing a CT scan of the chest with contrast every 6 months and labs for the first 2 or 3 years.  If that goes well then we can go to a CT scan of the chest without contrast once a year and she completes 5 years of follow-up.  She was agreeable to the plan.    2.  Going through her previous history and the [facility], it is clear that she has COPD.  From her description of her symptoms I think this is more of a chronic bronchitis.  I think she would benefit from being followed by pulmonary to improve her symptoms and to preserve her lung function.  She was agreeable to a referral to pulmonary and this was placed.    3.  She has some residual pain at the site of the surgery.  I discussed with her that I expect that this will improve slowly over the coming months.  I do not think we should start any new medications for this.  I discussed with her that the breathing exercises will help.  If she quits smoking that will also help.  She voiced understanding.    4.  She has a longstanding history of smoking.  From her history, she has more than 50 pack years of smoking.  She is trying to cut down on her smoking and has reduced about 9 cigarettes/day.  I discussed with her that it is very important that she quit smoking both from the lung cancer recurrence point of view and even from the BONNIE-III recurrence point of view.  It has other health benefits also.  I explained to her that it has been shown very clearly that people who have set a quit date are more successful than people who try to reduce smoking and to try to quit.  I advised her to get a hold of cigarettes at home and to set a quit date and not buy anymore.  She plans to quit with her daughter with whom she plans to live.  She is agreeable to a referral to the tobacco cessation clinic and I have referred her to quit plan.  Additional time spent: 7 minutes.    5.  I reminded her that she does need to keep her follow-up with gynecology for the BONNIE  3.    6.  Reminded her that she should continue with regular screening for other cancers.  She should have her annual mammogram.  The last one was done in [facility] [employer] on 9/26/2019.  She says that she had colon cancer screening by stool testing in the last year.  Recommended that she should follow-up with primary care on a regular basis.    7.  Follow-up: I have ordered a CT scan of the chest with contrast in 6 months time.  At that time she will have a CBC differential platelets and CMP also done.  To see MD or NP in follow-up a day or 2 after the CT scan and labs.      ECOG Performance   ECOG Performance Status: 1(wants to get back pool exercise)  Distress Assessment  Distress Assessment Score: 2(wants to get back to pool)        Problem List    1. Primary cancer of left upper lobe of lung (H)  CT Chest With Contrast    HM1(CBC and Differential)    Comprehensive Metabolic Panel    Ambulatory referral to Pulmonology   2. Encounter for tobacco use cessation counseling  Ambulatory referral to Tobacco Cessation Program   3. Simple chronic bronchitis (H)  Ambulatory referral to Pulmonology           CC: Provider, No Primary Care      ______________________________________________________________________________      Staging History    Cancer Staging  Primary cancer of left upper lobe of lung (H)  Staging form: Lung, AJCC 8th Edition  - Pathologic stage from 1/15/2020: Stage IA1 (pT1a, pN0, cM0) - Signed by Citlali Gerber MD on 1/15/2020      History    Ms. Mary Lang is a 64-year-old woman who is here for the consultation alone.    She had a screening low-dose CT scan of the chest done on 9/24/2019 in the [facility] system because of her history of smoking.  This showed a spiculated solid nodule in the left upper lobe measuring about 10 x 6 mm.  There are additional tiny lung nodules seen on either side of the lungs.  She was referred to pulmonary and she had PFTs and a PET CT scan done on 10/24/2019.  The  PET scan showed that the left upper lobe 1 cm lung nodule was PET positive at a low level.  PET activity in the rest of the body was normal.  She was then referred to thoracic surgery.  She had a thoracotomy with an initial wedge resection followed by left upper lobectomy and mediastinal lymphadenectomy done on 12/19/2019 by Dr. Ford Norwood at [facility].  Because of insurance reasons she now has to follow-up in the [facility] system and so she needs ongoing follow-up for her lung cancer.    She feels that she has recovered substantially from the lung cancer surgery.  She feels that her breathing is almost back to normal.  She states that she can walk a good distance on level ground.  Going up and down stairs is a bit more difficult for her because of chronic back issues.  She has only a rare cough.  Nowadays she does not bring up any phlegm.  She feels that she has some pain at the surgical site on and off.  However this is been slowly getting better day after day since surgery.    She gives a history of feeling short of breath usually when she lays down.  She says that she feels it most when she wakes up in the morning or when she wakes up from a nap.  She believes this is because of her lung disease.    She has a longstanding history of repeated respiratory infections but has not had any infections over this last winter.    She remains fairly physically active taking care of all the work needed at home.  She says that she does gardening etc. but she does not formally exercise.    She has longstanding back pain which still acts up now and then.    She admits that she is still smoking.  She is slowly bringing down the amount of smoking.  She says that she is down to about 9 cigarettes/day.  She is hoping that 1 of these days she can quit completely.  She says that she is going to obtain a nicotine patch for which she has received a prescription.    No fevers.  No night sweats.  No lumps or bumps anywhere.   No loss of weight.  No unusual headaches.  No eyesight problems.  No mouth sores.  No swallowing difficulty.  No nausea or vomiting.  No abdominal pain.  No constipation or diarrhea.  No blood in stool or black stools.  No vaginal bleeding.  No difficulty with urination.  No skin rashes.  No numbness or tingling.    Please see below.  A 14 point review of system is otherwise completely negative.    Past History  Past Medical History:   Diagnosis Date     Abnormal Papanicolaou smear of cervix with positive human papilloma virus (HPV) test      Amblyopia, right eye     patched at age 5.     Chronic bronchitis (H)      Essential hypertension 2014     Hypothyroidism 2009     Osteoarthritis      Primary cancer of left upper lobe of lung (H) 2019     Shingles      Vulvar intraepithelial neoplasia III (BONNIE III) 2016     Past Surgical History:   Procedure Laterality Date      SECTION      x2     CHOLECYSTECTOMY  1985     CYST REMOVAL  2017    Sebaceous cyst excision from the back.     LAPAROSCOPIC APPENDECTOMY  2018    For acute appendicitis.     MICRODISCECTOMY LUMBAR Right 10/28/2003    Right L5-S1 micro discectomy.     MICRODISCECTOMY LUMBAR Right 2004    Lumbar reexploration and a right L5-S1 microdiscectomy.     THORACOTOMY Left 2019    with left upper lobe wedge resection and left upper lobectomy, mediastinal lymphadenectomy     TOTAL HIP ARTHROPLASTY Left 10/02/2012     VAGINA SURGERY  2006    Tension-free vaginal tape.  Cystoscopy.  For stress urinary incontinence with intrinsic sphincter deficiency.     VULVA SURGERY Right 2016    Right vulvar wide local excision.      Family History   Problem Relation Age of Onset     COPD Mother      Bone cancer Paternal Grandmother      Cancer Paternal Uncle         of the eye.     Parkinsonism Father      Diabetes type II Father      No Medical Problems Brother      No Medical Problems Son      No Medical  Problems Son      No Medical Problems Son      No Medical Problems Daughter      No Medical Problems Daughter      Arthritis Brother      No Medical Problems Brother      No Medical Problems Brother      Social History     Socioeconomic History     Marital status: Single     Spouse name: None     Number of children: None     Years of education: None     Highest education level: None   Occupational History     Occupation: Retired.     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   Social Needs     Financial resource strain: None     Food insecurity:     Worry: None     Inability: None     Transportation needs:     Medical: None     Non-medical: None   Tobacco Use     Smoking status: Current Some Day Smoker     Packs/day: 0.25     Years: 52.00     Pack years: 13.00     Types: Cigarettes     Smokeless tobacco: Never Used   Substance and Sexual Activity     Alcohol use: Not Currently     Frequency: Never     Drug use: Never     Sexual activity: Yes     Partners: Male   Lifestyle     Physical activity:     Days per week: None     Minutes per session: None     Stress: None   Relationships     Social connections:     Talks on phone: None     Gets together: None     Attends Mandaeism service: None     Active member of club or organization: None     Attends meetings of clubs or organizations: None     Relationship status: None     Intimate partner violence:     Fear of current or ex partner: None     Emotionally abused: None     Physically abused: None     Forced sexual activity: None   Other Topics Concern     None   Social History Narrative     None     Allergies    No Known Allergies    Review of Systems    General  General (WDL): All general elements are within defined limits  ENT  ENT (WDL): Exceptions to WDL  Changes in vision: Yes - Recent (Less than 3 months)  Glasses or Contacts: Yes - Chronic (Greater than 3 months)  Hearing loss: None  Hearing Aids: None  Tinnitus: None  Pain/Pressure in ears: None  Sinus  Congestion/Drainage: Yes - Chronic (Greater than 3 months)  Hoarseness: Yes - Chronic (Greater than 3 months)  Sore Throat: None  Dental Problems: None  Dentures: Yes - Chronic (Greater than 3 months)  Respiratory  Respiratory (WDL): Exceptions to WDL  Dyspnea: Yes - Chronic (Greater than 3 months)  hemoptysis: None  Is patient on O2?: None  Cough: Yes - Chronic (Greater than 3 months)  Cardiovascular  Cardiovascular (WDL): All cardiovascular elements are within defined limits  Endocrine  Endocrine (WDL): All endocrine elements are within defined limits  Gastrointestinal  Gastrointestinal (WDL): Exceptions to WDL  Difficulty Swallowing: None  Heartburn: None  Constipation: Yes - Chronic (Greater than 3 months)  Yellowish skin and/or eyes: None  Blood from rectum: None  Nausea and Vomiting: None  Abdominal Pain: None  Diarrhea: None  Have had black or tan stools?: None  Hemorrhoids: None  Poor Appetite: None  Musculoskeletal  Musculoskeletal (WDL): Exceptions to WDL  Range of Motion Limitation: None  Joint pain: Yes - Chronic (Greater than 3 months)  Back Pain: Yes - Chronic (Greater than 3 months)  Activity Assistance: None  Difficulty to lie flat for more than 30 minutes: None  Pain interfering with walking: None  Muscle pain or stiffness: None  Recent fall: None  Assistive device: None  Neurological  Neurological (WDL): All neurological elements are within defined limits  Dominant Hand: Right  Psychological/Emotional  Psychological/Emotional (WDL): Exceptions to WDL  Depression: None  Insomnia: None  Panic attacks: None  Anxiety: None  Daytime sleepiness: Yes - Recent (Less than 3 months)  Hallucinations: None  Psychosocial needs or not coping well: None  Hematological/Lymphatic  Hematological/Lymphatic (WDL): All hematological/lymphatic elements are within defined limits  Dermatological  Dermatologic (WDL): All dermatological elements are within defined  "limits  Genitourinary/Reproductive  Genitourinary/Reproductive (WDL): Exceptions to WDL  Urinary Frequency: None  Urinary Incontinence: Yes - Chronic (Greater than 3 months)  Painful urination: None  Urination more than 2 times a night: None  Urinary Urgency: None  Difficulty Initiating Urine Stream: None  Blood in urine: None  Sensation of incomplete emptying of bladder: None  Reproductive (Females only)     Pain  Currently in Pain: Yes  Pain Score (Initial OR Reassessment): 2  Pain Frequency: Intermittent  Location: Lt lateral/posterior back  Pain Characteristics : Burning;Aching;Sore;Numbness  Pain Intervention(s): Home medication;Rest;Repositioned    Physical Exam    Recent Vitals 1/15/2020   Height 5' 5\"   Weight 178 lbs 6 oz   BSA (m2) 1.93 m2   /70   Pulse 99   Temp 97.7   Temp src 1   SpO2 94       GENERAL: Alert and oriented to time place and person. Seated comfortably. In no distress.  Looks well overall.  Very pleasant.    HEAD: Atraumatic and normocephalic.    EYES: CHERRY, EOMI.  No pallor.  No icterus.    Oral cavity: no mucosal lesion or tonsillar enlargement.    NECK: supple. JVP normal.  No thyroid enlargement.    LYMPH NODES: No palpable, cervical, axillary or inguinal lymphadenopathy.    CHEST: clear to auscultation bilaterally.  Surgical scars on the left side chest wall appear well-healed.  Resonant to percussion throughout bilaterally.  Symmetrical breath movements bilaterally.    CVS: S1 and S2 are heard. Regular rate and rhythm.  No murmur or gallop or rub heard.  No peripheral edema.    ABDOMEN: Soft. Not tender. Not distended.  No palpable hepatomegaly or splenomegaly.  No other mass palpable.  Bowel sounds heard.    EXTREMITIES: Warm.    SKIN: no rash, or bruising or purpura.  Has a full head of hair.      Lab Results    Lab reports from [facility] from December 2019 reviewed through care everywhere.    Pathology report from the lung cancer surgery dated 12/19/2019 reviewed and " the data updated in the staging form.      Imaging Results    Reports of the CT scan of the chest dated 9/24/2019 and the PET CT scan dated 10/24/2019 reviewed through care everywhere.      Signed by: Citlali Gerber MD

## 2021-06-05 NOTE — TELEPHONE ENCOUNTER
Reached out to patient and I was informed she is currently taking 900 mg of Gabapentin 3 times a day. Requesting an updated prescription be submitted to the pharmacy to reflect the the current dose she is taking. Please see the below [facility] message for additional information. Thank you, Fanny Rene

## 2021-06-05 NOTE — PROGRESS NOTES
Pulmonary Clinic Outpatient Consultation    Assessment and Plan:   64 y F with a 50+ history of tobacco abuse, currently smoking, COPD, a recent diagnosis of stage IA lung adenocarcinoma status post left upper lobe wedge resection followed by a left upper lobectomy and mediastinal lymphadenectomy in December 2019, who presents to establish care for COPD. PFTs show moderate obstruction, she has mild baseline exertional shortness of breath and a chronic productive cough.      PLAN:  GOLD A COPD w moderate obstruction    Currently over utilizing albuterol - will start spiriva, and continue as needed albuterol    Encouraged ongoing exercise, no indication for pulmonary rehab at this point    Strongly encouraged smoking cessation - she has nicotine patches, and declines another trial of chantix due to cost. She plans to set a date this month. Discussed for 6 minutes.     She is up to date on vaccinations      Stage 1A lung adenocarcinoma, s/p resection    Continued routine follow-up with oncology w surveillance scans    Smoking cessation       I will see her back in several months for f/u of inhaler efficacy.       Olinda Qureshi MD  Pulmonary and Critical Care Medicine  [facility]  Office: [phone]  Pager: [phone]    ----------------------    Reason for Consult: COPD    HPI:   64 y F with a 50+ history of tobacco abuse, currently smoking, COPD, a recent diagnosis of stage IA lung adenocarcinoma status post left upper lobe wedge resection followed by a left upper lobectomy and mediastinal lymphadenectomy at [facility] in December 2019, who presents to establish care for COPD. She is currently following with Dr. Citlali Gerber in oncology for cancer surveillance. She was previously seen by Dr. Rick Jenkins in pulmonary with [facility] Nov 2019, and on a screening CT  "scan her lung cancer was found. She is transitioning care currently due to insurance changes.    She was diagnosed with \"asthma\" 10 years ago, prescribed albuterol, which helps. She currently uses this before significant activity, on average 4x per day. She still does water aerobics, trys to remain active. She has personal or FH of asthma. Her breathing is triggered by cold air, and is worse first thing in the morning. She has had no AECOPD, ER visits. She has chronic cough w phlegm production. She has significant PND, and and is using atrovent and flonase several times per day.    She is still smoking, and used chanix in the past, concerned about costs presently. She has nicotine patches and is setting a quit date this month. She has mild baseline exertional shortness of breath and a chronic productive cough.    ROS:  A 12-system review was obtained and was negative with the exception of the symptoms endorsed in the history of present illness.    PMH:  Past Medical History:   Diagnosis Date     Abnormal Papanicolaou smear of cervix with positive human papilloma virus (HPV) test      Amblyopia, right eye     patched at age 5.     Chronic bronchitis (H)      Essential hypertension 2014     Hypothyroidism 2009     Osteoarthritis      Primary cancer of left upper lobe of lung (H) 2019     Shingles      Vulvar intraepithelial neoplasia III (BONNIE III) 2016     PSH:  Past Surgical History:   Procedure Laterality Date      SECTION      x2     CHOLECYSTECTOMY  1985     CYST REMOVAL  2017    Sebaceous cyst excision from the back.     LAPAROSCOPIC APPENDECTOMY  2018    For acute appendicitis.     MICRODISCECTOMY LUMBAR Right 10/28/2003    Right L5-S1 micro discectomy.     MICRODISCECTOMY LUMBAR Right 2004    Lumbar reexploration and a right L5-S1 microdiscectomy.     THORACOTOMY Left 2019    with left upper lobe wedge resection and left upper lobectomy, mediastinal " lymphadenectomy     TOTAL HIP ARTHROPLASTY Left 10/02/2012     VAGINA SURGERY  03/01/2006    Tension-free vaginal tape.  Cystoscopy.  For stress urinary incontinence with intrinsic sphincter deficiency.     VULVA SURGERY Right 05/16/2016    Right vulvar wide local excision.      Allergies:  No Known Allergies    Family HX:  Family History   Problem Relation Age of Onset     COPD Mother      Bone cancer Paternal Grandmother      Cancer Paternal Uncle         of the eye.     Parkinsonism Father      Diabetes type II Father      No Medical Problems Brother      No Medical Problems Son      No Medical Problems Son      No Medical Problems Son      No Medical Problems Daughter      No Medical Problems Daughter      Arthritis Brother      No Medical Problems Brother      No Medical Problems Brother      Social Hx:  Social History     Socioeconomic History     Marital status: Single     Spouse name: Not on file     Number of children: Not on file     Years of education: Not on file     Highest education level: Not on file   Occupational History     Occupation: Retired.     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   Social Needs     Financial resource strain: Not on file     Food insecurity:     Worry: Not on file     Inability: Not on file     Transportation needs:     Medical: Not on file     Non-medical: Not on file   Tobacco Use     Smoking status: Current Some Day Smoker     Packs/day: 0.25     Years: 52.00     Pack years: 13.00     Types: Cigarettes     Smokeless tobacco: Never Used   Substance and Sexual Activity     Alcohol use: Not Currently     Frequency: Never     Drug use: Never     Sexual activity: Yes     Partners: Male   Lifestyle     Physical activity:     Days per week: Not on file     Minutes per session: Not on file     Stress: Not on file   Relationships     Social connections:     Talks on phone: Not on file     Gets together: Not on file     Attends Adventism service: Not on file     Active member  "of club or organization: Not on file     Attends meetings of clubs or organizations: Not on file     Relationship status: Not on file     Intimate partner violence:     Fear of current or ex partner: Not on file     Emotionally abused: Not on file     Physically abused: Not on file     Forced sexual activity: Not on file   Other Topics Concern     Not on file   Social History Narrative     Not on file   Tobacco: 1/2 -  1 PPD x 50 years, currently 1/2 PPD  Worked as a , retired now  5 children, 2 children have asthma    Physical Exam:  /72   Pulse (!) 106   Resp 24   Ht 5' 4.75\" (1.645 m)   Wt 176 lb 14.4 oz (80.2 kg)   SpO2 97% Comment: RA  BMI 29.67 kg/m    Gen: Alert, oriented, no distress  HEENT: nasal turbinates are very inflamed and narrowed, no oropharyngeal lesions, no cervical or supraclavicular lymphadenopathy  CV: RRR, no M/G/R  Resp: CTAB, no focal crackles or wheezes  Abd: soft, nontender, no palpable organomegaly  Skin: no apparent rashes  Ext: no cyanosis, clubbing or edema  Neuro: alert, nonfocal    Labs:  Reviewed    Imaging studies:  Personally reviewed and interpreted:    9/24/19 Screening CT:  FINDINGS:  LUNGS AND PLEURA: Spiculated solid nodule in the left upper lobe measuring 10 x 6 mm (series 13 image 25). Additional tiny pulmonary nodules, including a 3 mm solid nodule in the right upper lobe (series 13 image 19), a 2 mm solid nodule in the right upper lobe (image 18), and a 2 mm solid nodule in the left lower lobe (image 79). No pleural effusions. Mild centrilobular emphysema.    MEDIASTINUM: No enlarged thoracic lymph nodes. Normal heart size. Normal caliber thoracic aorta. No pericardial effusion.    CORONARY ARTERY CALCIFICATION: None    LIMITED UPPER ABDOMEN: Status post cholecystectomy.    MUSCULOSKELETAL: Negative.    CONCLUSION:  1. There are a few solid pulmonary nodules, the largest in the left upper lobe measuring 10 x 6 mm.    PFT's   [facility]  FEV1/FVC 57, FEV1 66 FVC 90  Neg BD response  ,   DLCO 72     Moderate obstruction, mild diffusion impairment

## 2021-06-05 NOTE — TELEPHONE ENCOUNTER
Pt given message from Dr. Burgess and states understanding. She will call with updates. No questions at this time.

## 2021-06-06 NOTE — TELEPHONE ENCOUNTER
FYI - Status Update  Who is Calling: [employer]  Update: We are a new pharmacy for patient.  Please resend this medication.   Okay to leave a detailed message?:  No return call needed

## 2021-06-07 NOTE — PROGRESS NOTES
"Mary Lang is a 64 y.o. female who is being evaluated via a billable telephone visit.      The patient has been notified of following:     \"This telephone visit will be conducted via a call between you and your physician/provider. We have found that certain health care needs can be provided without the need for a physical exam.  This service lets us provide the care you need with a short phone conversation.  If a prescription is necessary we can send it directly to your pharmacy.  If lab work is needed we can place an order for that and you can then stop by our lab to have the test done at a later time.    Telephone visits are billed at different rates depending on your insurance coverage. During this emergency period, for some insurers they may be billed the same as an in-person visit.  Please reach out to your insurance provider with any questions.    If during the course of the call the physician/provider feels a telephone visit is not appropriate, you will not be charged for this service.\"    Patient has given verbal consent to a Telephone visit? Yes    Patient would like to receive their AVS by AVS Preference: Sindhu.    Additional provider notes: Ms. Lang is a 64 year old femae with Gold A COPD. She was seen for the first time in January by Dr. Qureshi and started on spiriva.  Since doing so she has had a good improvement and is now no longer using her albuterol frequently.  She is using her rescue inhaler maybe once a week at this point.  She does have a daily cough with mucous but is still smoking about 15 cigarettes per day.  She has no issues with dyspnea with steps or activities she needs to do.      Plan - stay on spiriva as current, albuterol PRN.  Continue to try to stop smoking.    Refer back to PCP, we should not need to manage her level of COPD.    CAT:  How often do you cough?: 3  How much phlegm (mucous) do you have in your chest?: 1  How tight does your chest feel?: 0  How breathless are you " after climbing a hill or flight of stairs?: 2  How limited are your activities at home?: 0  How confident are you leaving home despite your lung condition?: 0  How soundly do you sleep?: 0  How much energy do you have?: 2  Total Score: 8      Phone call duration: 15 minutes    Lb Newsome MD PhD  [facility]    Margarita Wade LPN

## 2021-06-07 NOTE — PROGRESS NOTES
Chart reviewed by Ambulatory Quality and Data team    Please abstract the following data from this visit with this patient into the appropriate field in Epic:    Tests that can be patient reported without a hard copy:        Other Tests found in the patient's chart through Chart Review/Care Everywhere:    Mammogram done by this group [facility] this date: 9/26/19 and [facility] this [facility]16[facility]19 and FIT done by this group [facility] on this date: 9/27/19    Note to Abstraction: If this section is blank, no results were found via Chart Review/Care Everywhere.

## 2021-06-09 NOTE — PROGRESS NOTES
[facility] Hematology and Oncology Progress Note    Patient: Mary Lang  MRN: [id number]  Date of Service: 07/15/2020        Reason for Visit    Follow-up of left-sided lung cancer.    Assessment and Plan  Cancer Staging  Primary cancer of left upper lobe of lung (H)  Staging form: Lung, AJCC 8th Edition  - Pathologic stage from 1/15/2020: Stage IA1 (pT1a, pN0, cM0) - Signed by Citlali Gerber MD on 1/15/2020      ECOG Performance   ECOG Performance Status: 0    Distress Assessment  Distress Assessment Score: No distress    Pain  Pain Score (Initial OR Reassessment): 1: Please see below.  No intervention is needed today.    Ms. Mary Lang is a 64 y.o. woman who was found to have a 1 cm left upper lobe spiculated lung lesion from a screening chest CT that was done on 9/24/2019 because of her longstanding smoking history.  She had a thoracotomy with initially a left upper lobe wedge resection, followed by a left upper lobectomy and mediastinal lymphadenectomy done on 12/19/2019.  Pathology showed an adenocarcinoma of the lung, not a predominant, 1 cm in size with no lymph node involvement.  Stage Ia.  Thus she did not need adjuvant chemotherapy.  We decided to go with long-term follow-up.    1.  Overall she appears to be doing well.  Symptomatically she is doing well from the breathing point of view.  I encouraged continued follow-up with pulmonary.  The twinges of pain at the surgical site appears to be slowly getting better and she has some does not think that any further intervention is needed.  On physical examination she looks good and there is no suspicious finding.    2.  She has had a CT scan of the chest with contrast done on 7/13/2020.  I reviewed the images and the report with her.  We can see the site of the left upper lobectomy with no suspicious finding.  She does have some small amount of right lower lobe mucus plugging consistent with her history of COPD mostly due to chronic bronchitis.  I showed  these findings to her.    She had had her blood counts and metabolic panel also checked.  I reviewed the findings with her.  Both are essentially normal.  She was happy to see that.    3.  I discussed with her that at this time we can continue with follow-up but the main concern that I have is about continued cigarette smoking.  To prevent the cancer from coming back it is very important that she stops completely.  That is going to be beneficial from many other points of view also for her health.  We again discussed about setting a date for stopping, not buying any more cigarettes etc.  I offered to refer her again to the tobacco cessation clinic and she declined.  She states that she was in contact with them and did not find it very helpful.  Finally she agreed to me writing a prescription for nicotine gum which she wants to try.  Additional time spent: 5 minutes.    4.  I discussed with her that it is important for her to keep up with her regular health maintenance.  [facility]  She has not had it ordered yet.  I have ordering it for her.  Expected in September or October this year.    5.  Encouraged her to follow-up with her primary physician also.    6.  I reminded her that she should have her seasonal flu vaccine this fall once it becomes available.  We discussed about precautions to be taken because of the coronavirus pandemic.    7.  Follow-up: I have ordered a CT scan of the chest with contrast to be done in 6 months.  At that time she will have the following labs done also: CBC depression platelets and CMP.  To see MD or NP in follow-up a day or 2 later.    Time spend >25 minutes face to face with the patient. More than 50 % in counseling and coordination of care.      Problem List    1. Primary cancer of left upper lobe of lung (H)  Mammo Screening Bilateral    CT Chest With Contrast    HM1(CBC and Differential)    Comprehensive Metabolic Panel     nicotine polacrilex (NICORETTE) 4 MG gum   2. Tobacco abuse counseling  nicotine polacrilex (NICORETTE) 4 MG gum   3. Encounter for screening mammogram for malignant neoplasm of breast   Mammo Screening Bilateral        CC: Cristobal Burgess MD    ______________________________________________________________________________    History of Present Illness    Ms. Mary Lang is here for follow-up alone.    Overall she feels that she is doing well.  She feels that her breathing is about normal.  Usually it is quite good but on hot humid days she does have some difficulty with shortness of breath.  She tends to stay inside in the air conditioning on those days.  She has follow-up with pulmonary.  Has only an occasional cough.  She does bring up some clear phlegm.  No blood streaking.    She says that she is healed well after the surgery.  She has only some twinges of pain at the surgical site below the breast on the left side.  She feels that is trivial and is hopeful that will also go away eventually.    She admits that she is still smoking.  She did try to quit using the nicotine patch but she feels that made her smoke even more.  At this time she is smoking about 15 cigarettes/day.  Her daughter also smokes.    No fevers or night sweats.  No lumps or bumps anywhere.  No unusual headaches.  No eyesight problems.  No mouth sores.  No swallowing difficulty.  No nausea or vomiting.  No abdominal pain.  No constipation or diarrhea.  No blood in stool or black stools.  No difficulty with urination.  No skin rashes.  No numbness or tingling.    Please see below.  A 14 point review of system is otherwise completely negative.    Pain Status  Currently in Pain: Yes    Review of Systems    Constitutional  Constitutional (WDL): All constitutional elements are within defined limits  Neurosensory  Neurosensory (WDL): Exceptions to WDL  Peripheral Motor Neuropathy: None  Ataxia: None  Peripheral Sensory Neuropathy: Asymptomatic,  loss of deep tendon reflexes or paresthesia(Lt chest below breast)  Confusion: None  Syncope: None  Cardiovascular  Cardiovascular (WDL): All cardiovascular elements are within defined limits  Pulmonary  Respiratory (WDL): Exceptions to WDL  Dyspnea: Shortness of breath with moderate exertion(cannot tolerate humidity)  Hypoxia: None  Gastrointestinal  Gastrointestinal (WDL): All gastrointestinal elements are within defined limits  Genitourinary  Genitourinary (WDL): All genitourinary elements are within defined limits  Integumentary  Integumentary (WDL): All integumentary elements are within defined limits  Patient Coping  Patient Coping: Open/discussion  Distress Assessment  Distress Assessment Score: No distress  Accompanied by       Past History  Past Medical History:   Diagnosis Date     Abnormal Papanicolaou smear of cervix with positive human papilloma virus (HPV) test      Amblyopia, right eye     patched at age 5.     Chronic bronchitis (H)      Essential hypertension 2014     Hypothyroidism 2009     Osteoarthritis      Primary cancer of left upper lobe of lung (H) 2019     Shingles      Vulvar intraepithelial neoplasia III (BONNIE III) 2016         Past Surgical History:   Procedure Laterality Date      SECTION      x2     CHOLECYSTECTOMY  1985     CYST REMOVAL  2017    Sebaceous cyst excision from the back.     LAPAROSCOPIC APPENDECTOMY  2018    For acute appendicitis.     MICRODISCECTOMY LUMBAR Right 10/28/2003    Right L5-S1 micro discectomy.     MICRODISCECTOMY LUMBAR Right 2004    Lumbar reexploration and a right L5-S1 microdiscectomy.     THORACOTOMY Left 2019    with left upper lobe wedge resection and left upper lobectomy, mediastinal lymphadenectomy     TOTAL HIP ARTHROPLASTY Left 10/02/2012     VAGINA SURGERY  2006    Tension-free vaginal tape.  Cystoscopy.  For stress urinary incontinence with intrinsic sphincter deficiency.     VULVA  SURGERY Right 05/16/2016    Right vulvar wide local excision.        Physical Exam    Recent Vitals 7/15/2020   Height -   Weight 180 lbs 11 oz   BSA (m2) 1.94 m2   /78   Pulse 94   Temp 98.4   Temp src 1   SpO2 94   Some recent data might be hidden       GENERAL: Alert and oriented to time place and person. Seated comfortably. In no distress.  She looks well overall.  A bit heavyset.  In very good spirits.    HEAD: Atraumatic and normocephalic.    EYES: CHERRY, EOMI.  No pallor.  No icterus.    Oral cavity: no mucosal lesion or tonsillar enlargement.    NECK: supple. JVP normal.  No thyroid enlargement.    LYMPH NODES: No palpable, cervical, axillary or inguinal lymphadenopathy.    CHEST: clear to auscultation bilaterally.  Resonant to percussion throughout bilaterally.  Symmetrical breath movements bilaterally.  Surgical scar on left side of the chest is healed well.    CVS: S1 and S2 are heard. Regular rate and rhythm.  No murmur or gallop or rub heard.  No peripheral edema.    ABDOMEN: Soft. Not tender. Not distended.  No palpable hepatomegaly or splenomegaly.  No other mass palpable.  Bowel sounds heard.    EXTREMITIES: Warm.    SKIN: no rash, or bruising or purpura.  Has a full head of hair.      Lab Results    Recent Results (from the past 168 hour(s))   Comprehensive Metabolic Panel   Result Value Ref Range    Sodium 142 136 - 145 mmol/L    Potassium 3.6 3.5 - 5.0 mmol/L    Chloride 105 98 - 107 mmol/L    CO2 27 22 - 31 mmol/L    Anion Gap, Calculation 10 5 - 18 mmol/L    Glucose 94 70 - 125 mg/dL    BUN 13 8 - 22 mg/dL    Creatinine 0.89 0.60 - 1.10 mg/dL    GFR MDRD Af Amer >60 >60 mL/min/1.73m2    GFR MDRD Non Af Amer >60 >60 mL/min/1.73m2    Bilirubin, Total 0.4 0.0 - 1.0 mg/dL    Calcium 9.4 8.5 - 10.5 mg/dL    Protein, Total 7.2 6.0 - 8.0 g/dL    Albumin 3.9 3.5 - 5.0 g/dL    Alkaline Phosphatase 81 45 - 120 U/L    AST 22 0 - 40 U/L    ALT 28 0 - 45 U/L   HM1 (CBC with Diff)   Result Value Ref  Range    WBC 8.0 4.0 - 11.0 thou/uL    RBC 4.86 3.80 - 5.40 mill/uL    Hemoglobin 14.5 12.0 - 16.0 g/dL    Hematocrit 43.8 35.0 - 47.0 %    MCV 90 80 - 100 fL    MCH 29.8 27.0 - 34.0 pg    MCHC 33.1 32.0 - 36.0 g/dL    RDW 14.2 11.0 - 14.5 %    Platelets 256 140 - 440 thou/uL    MPV 10.6 8.5 - 12.5 fL    Neutrophils % 65 50 - 70 %    Lymphocytes % 25 20 - 40 %    Monocytes % 6 2 - 10 %    Eosinophils % 2 0 - 6 %    Basophils % 1 0 - 2 %    Neutrophils Absolute 5.2 2.0 - 7.7 thou/uL    Lymphocytes Absolute 2.0 0.8 - 4.4 thou/uL    Monocytes Absolute 0.5 0.0 - 0.9 thou/uL    Eosinophils Absolute 0.2 0.0 - 0.4 thou/uL    Basophils Absolute 0.1 0.0 - 0.2 thou/uL       Imaging    Ct Chest With Contrast    Result Date: 7/13/2020  EXAM: CT CHEST W CONTRAST LOCATION: [facility] DATE/TIME: 7/13/2020 10:55 AM INDICATION: Shortness of breath. Resected lung cancer. COMPARISON: Chest CT 9/24/2019. PET/CT scan 10/24/2019. TECHNIQUE: CT chest with IV contrast. Multiplanar reformats were obtained. Dose reduction techniques were used. CONTRAST: Iohexol (Omni) 100 mL. FINDINGS: LUNGS AND PLEURA: Interval surgical changes of the left upper lobectomy. No acute airspace opacities. No pleural effusion or pneumothorax. Small amount of mucous plugging is seen within the right lower lobe. Mild centrilobular emphysematous changes of the lungs are present. MEDIASTINUM/AXILLAE: No lymphadenopathy. No thoracic aneurysm. UPPER ABDOMEN: No significant finding. MUSCULOSKELETAL: Healing fractures involve the lateral aspect of the left 6th rib from interval thoracotomy.     1.  Interval left upper lobectomy. No evidence of recurrent malignancy. 2.  Small amount of right lower lobe mucous plugging. No acute infiltrates. 3.  Stable emphysematous changes of the lungs.        Signed by: Citlali Gerber MD

## 2021-06-09 NOTE — PROGRESS NOTES
Patient is here for six month follow-up of lung cancer. Had labs, CT scan done.   Paola Ortez RN

## 2021-06-10 NOTE — TELEPHONE ENCOUNTER
I spoke with Mary explaining my role with HE and her SCP. I explained the contents and my process for getting it to her and that with her permission I would put it together, mail it to her via certified mail so I can track it. Once delivered, I would call to review. She confirmed her address and I thanked her for her time. Radha

## 2021-06-10 NOTE — TELEPHONE ENCOUNTER
"I spoke with Mary on f/u of mailing her SCP. She said she rec'd it and read thru it. I asked if she had any questions or comments. She responded, \"no, I'm pretty easy\". I touched on adv dir and she said she had one at [facility] and a copy at home. She offered to bring it in to her next visit and I confirmed that would be great. I reminded her of my contact information should she have needs or concerns and I congratulated her on her survivorship!! I wished her a good day. Radha  "

## 2021-06-11 NOTE — PROGRESS NOTES
Assessment and Plan:     Patient has been advised of split billing requirements and indicates understanding: Yes  1. Routine medical exam    Generally the patient is doing very well.  We discussed healthy lifestyles, including adequate exercise (3-4 times a week for 20-30 minutes), and a healthy diet.  Patient should return for annual physicals, and we can also see them here as needed.       2. Essential hypertension    Blood pressure is good on the current medication which is really just her hydrochlorothiazide at this point.  Labs done as listed below.      - Lipid Profile  - Comprehensive Metabolic Panel    3. Acquired hypothyroidism    She is due for a TSH today to make sure that her thyroid dose is appropriate and we will do that on that on the results when they become available.      - DXA Bone Density Scan; Future  - Thyroid Stimulating Hormone (TSH)    4. Primary cancer of left upper lobe of lung (H)    She is following with her oncologist regarding her cancer treatment which apparently is going very well and she is quite happy.    5. Asymptomatic menopausal state     She has not had a bone density scan done for a couple of years so we will go ahead and order that for her and follow-up on that.      - DXA Bone Density Scan; Future    6. Chronic right-sided low back pain with left-sided sciatica    With her back getting worse over time and now she is having symptoms almost every day basically at night going down the right leg we will go ahead and order an MRI of the low back and follow-up with her on the results when it becomes available and make a plan accordingly.      - MR Lumbar Spine Without Contrast; Future    7. Tobacco use    We talked about smoking cessation and the patient is really not interested at all right now and tells me that she will do it cold turkey on her own time when she is ready.    8. Need for tetanus booster    - Td, Preservative Free (green label)        The patient's current medical  problems were reviewed.    I have had an Advance Directives discussion with the patient.  I have counseled the patient for tobacco cessation and the follow up will occur  at the next visit.  The following health maintenance schedule was reviewed with the patient and provided in printed form in the after visit summary:   Health Maintenance   Topic Date Due     HEPATITIS C SCREENING  1955     SPIROMETRY  1955     COPD ACTION PLAN  1955     ADVANCE CARE PLANNING  07/26/1971     DXA SCAN  07/26/2018     TD 18+ HE  05/17/2020     INFLUENZA VACCINE RULE BASED (1) 08/01/2020     COLORECTAL CANCER SCREENING  09/27/2020     ZOSTER VACCINES (1 of 2) 09/18/2021 (Originally 7/26/2003)     FALL RISK ASSESSMENT  01/02/2021     MEDICARE ANNUAL WELLNESS VISIT  09/18/2021     MAMMOGRAM  09/25/2021     LIPID  09/16/2024     PNEUMOCOCCAL IMMUNIZATION 65+ LOW/MEDIUM RISK  Completed     HEPATITIS B VACCINES  Aged Out        Subjective:   Chief Complaint: Mary Lang is an 65 y.o. female here for an Annual Wellness visit.   HPI: Here today for a weight and annual wellness visit.  She does have a couple of other things to discuss today.  Mostly she is worried about her low back.  She has had back surgery in the past and now is having similar symptoms that she was having before.  She is having symptoms that she is calling sciatica, that has pain radiating from the right lower back into the right buttock and down the right leg.  She is having pain every night so badly that she needs to take 2 NyQuil and 4 ibuprofen just to try to get some sleep and it seems to be getting a bit worse over time.  She does not notice any weakness down into the right leg.  She has no symptoms on the left.  She does have alleviation of symptoms when she is either using a cart in a store or leaning against something which will make the pain temporarily better.    She has hypothyroidism and needs a TSH done today to make sure that her thyroid  replacement is adequate.    She has had a history of lung cancer and continues to smoke unfortunately.  We talked about that at some length today but she has no interested in quitting smoking right now.    She needs a bone density scan as she is overdue for that.        Review of Systems:    Please see above.  The rest of the review of systems are negative for all systems.    Patient Care Team:  Cristobal Burgess MD as PCP - General (Family Medicine)  Cristobal Burgess MD as Assigned PCP  Citlali Gerber MD as Physician (Hematology and Oncology)  Shonna Godwin RN as Oncology Nurse Navigator (Hematology and Oncology)     Patient Active Problem List   Diagnosis     Essential hypertension     Hypothyroidism     Primary cancer of left upper lobe of lung (H)     Chronic bronchitis (H)     Tobacco use     Chronic right-sided low back pain with left-sided sciatica     Past Medical History:   Diagnosis Date     Abnormal Papanicolaou smear of cervix with positive human papilloma virus (HPV) test      Amblyopia, right eye     patched at age 5.     Chronic bronchitis (H)      Essential hypertension 2014     Hypothyroidism 2009     Osteoarthritis      Primary cancer of left upper lobe of lung (H) 2019     Shingles      Vulvar intraepithelial neoplasia III (BONNIE III) 2016      Past Surgical History:   Procedure Laterality Date      SECTION      x2     CHOLECYSTECTOMY  1985     CYST REMOVAL  2017    Sebaceous cyst excision from the back.     LAPAROSCOPIC APPENDECTOMY  2018    For acute appendicitis.     MICRODISCECTOMY LUMBAR Right 10/28/2003    Right L5-S1 micro discectomy.     MICRODISCECTOMY LUMBAR Right 2004    Lumbar reexploration and a right L5-S1 microdiscectomy.     THORACOTOMY Left 2019    with left upper lobe wedge resection and left upper lobectomy, mediastinal lymphadenectomy     TOTAL HIP ARTHROPLASTY Left 10/02/2012     VAGINA SURGERY  2006    Tension-free  vaginal tape.  Cystoscopy.  For stress urinary incontinence with intrinsic sphincter deficiency.     VULVA SURGERY Right 05/16/2016    Right vulvar wide local excision.       Family History   Problem Relation Age of Onset     COPD Mother      Bone cancer Paternal Grandmother      Cancer Paternal Uncle         of the eye.     Parkinsonism Father      Diabetes type II Father      No Medical Problems Brother      No Medical Problems Son      No Medical Problems Son      No Medical Problems Son      No Medical Problems Daughter      No Medical Problems Daughter      Arthritis Brother      No Medical Problems Brother      No Medical Problems Brother       Social History     Socioeconomic History     Marital status: Single     Spouse name: Not on file     Number of children: Not on file     Years of education: Not on file     Highest education level: Not on file   Occupational History     Occupation: Retired.     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   Social Needs     Financial resource strain: Not on file     Food insecurity     Worry: Not on file     Inability: Not on file     Transportation needs     Medical: Not on file     Non-medical: Not on file   Tobacco Use     Smoking status: Current Some Day Smoker     Packs/day: 0.50     Years: 52.00     Pack years: 26.00     Types: Cigarettes     Smokeless tobacco: Never Used   Substance and Sexual Activity     Alcohol use: Not Currently     Frequency: Never     Drug use: Never     Sexual activity: Yes     Partners: Male   Lifestyle     Physical activity     Days per week: Not on file     Minutes per session: Not on file     Stress: Not on file   Relationships     Social connections     Talks on phone: Not on file     Gets together: Not on file     Attends Tenriism service: Not on file     Active member of club or organization: Not on file     Attends meetings of clubs or organizations: Not on file     Relationship status: Not on file     Intimate partner violence     " Fear of current or ex partner: Not on file     Emotionally abused: Not on file     Physically abused: Not on file     Forced sexual activity: Not on file   Other Topics Concern     Not on file   Social History Narrative     Not on file      Current Outpatient Medications   Medication Sig Dispense Refill     albuterol (PROAIR HFA;PROVENTIL HFA;VENTOLIN HFA) 90 mcg/actuation inhaler INHALE 2 TO 4 PUFFS BY MOUTH EVERY 4 HOURS AS NEEDED. MAX OF 12 PUFFS PER DAY.       aspirin 81 MG EC tablet Take 81 mg by mouth.       atorvastatin (LIPITOR) 20 MG tablet Take 1 tablet (20 mg total) by mouth daily. 90 tablet 3     calcium carb/vit D3/minerals (CALCIUM-VITAMIN D ORAL)        fish oil-omega-3 fatty acids 300-1,000 mg capsule Take 2 g by mouth daily.       fluticasone propionate (FLONASE) 50 mcg/actuation nasal spray 2 sprays.       gabapentin (NEURONTIN) 300 MG capsule Take 3 capsules (900 mg total) by mouth 3 (three) times a day. 270 capsule 3     hydroCHLOROthiazide (HYDRODIURIL) 25 MG tablet Take 25 mg by mouth daily.       ibuprofen (ADVIL,MOTRIN) 200 MG tablet Take 200-400 mg by mouth every 8 (eight) hours as needed.        ipratropium (ATROVENT) 0.03 % nasal spray 2 sprays.       levothyroxine (SYNTHROID, LEVOTHROID) 125 MCG tablet TAKE 1 TABLET EVERY DAY       tiotropium (SPIRIVA) 18 mcg inhalation capsule Place 1 capsule (2 puffs total) into inhaler and inhale daily. Place 1 capsule into the inhaler device. Close and press button to crush the capsule. Inhale the contents of the crushed capsule in 2 breaths. 90 capsule 3     No current facility-administered medications for this visit.       Objective:   Vital Signs:   Visit Vitals  /84 (Patient Site: Left Arm, Patient Position: Sitting, Cuff Size: Adult Regular)   Pulse 80   Ht 5' 5\" (1.651 m)   Wt 176 lb 11.2 oz (80.2 kg)   SpO2 96%   BMI 29.40 kg/m           VisionScreening:  No exam data present     PHYSICAL EXAM    General: Awake, Alert and Cooperative "   Head: Normocephalic and Atraumatic   Eyes: PERRL, EOMI.   ENT: Normal pearly TMs bilaterally and Oropharynx clear   Neck: Supple and Thyroid without enlargement or nodules   Chest: Chest wall normal   Lungs: Clear to auscultation bilaterally   Heart:: Regular rate and rhythm and no murmurs.  No significant LE edema.   Abdomen: Soft, nontender, nondistended and no hepatosplenomegaly   Musculoskeletal: Moving all extremities and No pain in the extremities   Neuro: Alert and oriented times 3 and Grossly normal   Skin: No rashes or lesions noted        Assessment Results 9/18/2020   Activities of Daily Living No help needed   Instrumental Activities of Daily Living No help needed   Mini Cog Total Score 5   Some recent data might be hidden         Identified Health Risks:     Information on urinary incontinence and treatment options given to patient.  Patient's advanced directive was discussed and I am comfortable with the patient's wishes.

## 2021-06-11 NOTE — PATIENT INSTRUCTIONS - HE
Patient Education   Urinary Incontinence, Female (Adult)  Urinary incontinence means loss of control of the bladder. This problem affects many women, especially as they get older. If you have incontinence, you may be embarrassed to ask for help. But know that this problem can be treated.  Types of Incontinence  There are different types of incontinence. Two of the main types are described here. You can have more than one type.    Stress incontinence. With this type, urine leaks when pressure (stress) is put on the bladder. This may happen when you cough, sneeze, or laugh. Stress incontinence most often occurs because the pelvic floor muscles that support the bladder and urethra are weak. This can happen after pregnancy and vaginal childbirth or a hysterectomy. It can also be due to excess body weight or hormone changes.    Urge incontinence (also called overactive bladder). With this type, a sudden urge to urinate is felt often. This may happen even though there may not be much urine in the bladder. The need to urinate often during the night is common. Urge incontinence most often occurs because of bladder spasms. This may be due to bladder irritation or infection. Damage to bladder nerves or pelvic muscles, constipation, and certain medicines can also lead to urge incontinence.  Treatment of urinary incontinence depends on the cause. Further evaluation is needed to find the type you have. This will likely include an exam and certain tests. Based on the results, you and your healthcare provider can then plan treatment. Until a diagnosis is made, the home care tips below can help relieve symptoms.  Home care    Do pelvic floor muscle exercises, if they are prescribed. The pelvic floor muscles help support the bladder and urethra. Many women find that their symptoms improve when doing special exercises that strengthen these muscles. To do the exercises contract the muscles you would use to stop your stream of urine,  but do this when you re not urinating. Hold for 10 seconds, then relax. Repeat 10 to 20 times in a row, at least 3 times a day. Your provider may give you other instructions for how to do the exercises and how often.    Keep a bladder diary. This helps track how often and how much you urinate over a set period of time. Bring this diary with you to your next visit with the provider. The information can help your provider learn more about your bladder problem.    Lose weight, if advised to by your provider. Excess weight puts pressure on the bladder. Your provider can help you create a weight-loss plan that s right for you. This may include exercising more and making certain diet changes.    Don't consume foods and drinks that may irritate the bladder. These can include alcohol and caffeinated drinks.    Quit smoking. Smoking and other tobacco use can lead to chronic cough that strains the pelvic floor muscles. Smoking may also damage the bladder and urethra. Talk with your provider about treatments or methods you can use to quit smoking.    If drinking large amounts of fluid causes you to have symptoms, you may be advised to limit your fluid intake. You may also be advised to drink most of your fluids during the day and to limit fluids at night.    If you re worried about urine leakage or accidents, you may wear absorbent pads to catch urine. Change the pads often. This helps reduce discomfort. It may also reduce the risk of skin or bladder infections.  Follow-up care  Follow up with your healthcare provider, or as directed. It may take some to find the right treatment for your problem. Your treatment plan may include special therapies or medicines. Certain procedures or surgery may also be options. Be sure to discuss any questions you have with your provider.  When to seek medical advice  Call the healthcare provider right away if any of these occur:    Fever of 100.4 F (38 C) or higher, or as directed by your  provider    Bladder pain or fullness    Abdominal swelling    Nausea or vomiting    Back pain    Weakness, dizziness or fainting  Date Last Reviewed: 10/1/2017    0521-7376 [employer]. [address], [place] [address]. All rights reserved. This information is not intended as a substitute for professional medical care. Always follow your healthcare professional's instructions.       Advance Directive  Patient s advance directive was discussed and I am comfortable with the patient s wishes.  Patient Education   Personalized Prevention Plan  You are due for the preventive services outlined below.  Your care team is available to assist you in scheduling these services.  If you have already completed any of these items, please share that information with your care team to update in your medical record.  Health Maintenance   Topic Date Due     HEPATITIS C SCREENING  1955     SPIROMETRY  1955     COPD ACTION PLAN  1955     ADVANCE CARE PLANNING  07/26/1971     DXA SCAN  07/26/2018     TD 18+ HE  05/17/2020     INFLUENZA VACCINE RULE BASED (1) 08/01/2020     COLORECTAL CANCER SCREENING  09/27/2020     ZOSTER VACCINES (1 of 2) 09/18/2021 (Originally 7/26/2003)     FALL RISK ASSESSMENT  01/02/2021     MEDICARE ANNUAL WELLNESS VISIT  09/18/2021     MAMMOGRAM  09/25/2021     LIPID  09/16/2024     PNEUMOCOCCAL IMMUNIZATION 65+ LOW/MEDIUM RISK  Completed     HEPATITIS B VACCINES  Aged Out

## 2021-06-12 NOTE — TELEPHONE ENCOUNTER
Medication Request  Medication name:   albuterol (PROAIR HFA;PROVENTIL HFA;VENTOLIN HFA) 90 mcg/actuation inhaler   3/5/2019     Sig: INHALE 2 TO 4 PUFFS BY MOUTH EVERY 4 HOURS AS NEEDED. MAX OF 12 PUFFS PER DAY.    Class: Historical Med        Requested Pharmacy: [facility]  Reason for request: Fax request received from pharmacy.    Okay to leave a detailed message: yes

## 2021-06-13 NOTE — TELEPHONE ENCOUNTER
Last Refill: 3/17/20 for #270, 3 refills - Take 3 capsules (900 mg total) by mouth 3 (three) times a day.  Last Visit: 9/18/20 -Physical

## 2021-06-14 NOTE — TELEPHONE ENCOUNTER
Refill Approved    Rx renewed per Medication Renewal Policy. Medication was last renewed on 2/6/20.    Jaymie Treviño, [facility] Connection Triage/Med Refill 12/26/2020     Requested Prescriptions   Pending Prescriptions Disp Refills     atorvastatin (LIPITOR) 20 MG tablet [Pharmacy Med Name: ATORVASTATIN CALCIUM 20 MG Tablet] 90 tablet 3     Sig: TAKE 1 TABLET (20 MG TOTAL) BY MOUTH DAILY.       Statins Refill Protocol (Hmg CoA Reductase Inhibitors) Passed - 12/23/2020  6:07 PM        Passed - PCP or prescribing provider visit in past 12 months      Last office visit with prescriber/PCP: 2/6/2020 Cristobal Burgess MD OR same dept: 2/6/2020 Cristobal Burgess MD OR same specialty: 2/6/2020 Cristobal Burgess MD  Last physical: 9/18/2020 Last MTM visit: Visit date not found   Next visit within 3 mo: Visit date not found  Next physical within 3 mo: Visit date not found  Prescriber OR PCP: Cristobal Burgess MD  Last diagnosis associated with med order: 1. Mixed hyperlipidemia  - atorvastatin (LIPITOR) 20 MG tablet [Pharmacy Med Name: ATORVASTATIN CALCIUM 20 MG Tablet]; Take 1 tablet (20 mg total) by mouth daily.  Dispense: 90 tablet; Refill: 3    If protocol passes may refill for 12 months if within 3 months of last provider visit (or a total of 15 months).

## 2021-06-16 PROBLEM — G89.29 CHRONIC RIGHT-SIDED LOW BACK PAIN WITH LEFT-SIDED SCIATICA: Status: ACTIVE | Noted: 2020-09-18

## 2021-06-16 PROBLEM — Z72.0 TOBACCO USE: Chronic | Status: ACTIVE | Noted: 2020-09-18

## 2021-06-16 PROBLEM — C34.12 PRIMARY CANCER OF LEFT UPPER LOBE OF LUNG (H): Chronic | Status: ACTIVE | Noted: 2020-01-02

## 2021-06-16 PROBLEM — M54.42 CHRONIC RIGHT-SIDED LOW BACK PAIN WITH LEFT-SIDED SCIATICA: Status: ACTIVE | Noted: 2020-09-18

## 2021-06-20 NOTE — LETTER
Letter by Olinda Qureshi MD at      Author: Olinda Qureshi MD Service: -- Author Type: --    Filed:  Encounter Date: 1/30/2020 Status: (Other)         Citlali Gerber MD  [facility]  [address] [place] [address]                                  January 30, 2020    Patient: Mary Lang   MR Number: [id number]   YOB: 1955   Date of Visit: 1/30/2020     Dear Dr. Buzz MD:    Thank you for referring Mary Lang to me for evaluation. Below are the relevant portions of my assessment and plan of care.    If you have questions, please do not hesitate to call me. I look forward to following Mary along with you.    Sincerely,        Olinda Qureshi MD            MD Kiera Bear Nicole Lynn, MD  1/30/2020  1:20 PM  Sign when Signing Visit                                                                                                                                                   Pulmonary Clinic Outpatient Consultation    Assessment and Plan:   64 y F with a 50+ history of tobacco abuse, currently smoking, COPD, a recent diagnosis of stage IA lung adenocarcinoma status post left upper lobe wedge resection followed by a left upper lobectomy and mediastinal lymphadenectomy in December 2019, who presents to establish care for COPD. PFTs show moderate obstruction, she has mild baseline exertional shortness of breath and a chronic productive cough.      PLAN:  GOLD A COPD w moderate obstruction    Currently over utilizing albuterol - will start spiriva, and continue as needed albuterol    Encouraged ongoing exercise, no indication for pulmonary rehab at this point    Strongly encouraged smoking cessation - she has nicotine patches, and declines another trial of chantix due to cost. She plans to set a date this month. Discussed for 6 minutes.     She is up to date on vaccinations      Stage 1A lung adenocarcinoma, s/p resection    Continued routine follow-up with oncology w  "surveillance scans    Smoking cessation       I will see her back in several months for f/u of inhaler efficacy.       Olinda Qureshi MD  Pulmonary and Critical Care Medicine  [facility]  Office: [phone]  Pager: [phone]    ----------------------    Reason for Consult: COPD    HPI:   64 y F with a 50+ history of tobacco abuse, currently smoking, COPD, a recent diagnosis of stage IA lung adenocarcinoma status post left upper lobe wedge resection followed by a left upper lobectomy and mediastinal lymphadenectomy at [facility] in December 2019, who presents to establish care for COPD. She is currently following with Dr. Citlali Gerber in oncology for cancer surveillance. She was previously seen by Dr. Rick Jenkins in pulmonary with  Nov 2019, and on a screening CT scan her lung cancer was found. She is transitioning care currently due to insurance changes.    She was diagnosed with \"asthma\" 10 years ago, prescribed albuterol, which helps. She currently uses this before significant activity, on average 4x per day. She still does water aerobics, trys to remain active. She has personal or FH of asthma. Her breathing is triggered by cold air, and is worse first thing in the morning. She has had no AECOPD, ER visits. She has chronic cough w phlegm production. She has significant PND, and and is using atrovent and flonase several times per day.    She is still smoking, and used chanix in the past, concerned about costs presently. She has nicotine patches and is setting a quit date this month. She has mild baseline exertional shortness of breath and a chronic productive cough.    ROS:  A 12-system review was obtained and was negative with the exception of the symptoms endorsed in the history of present illness.    PMH:  Past Medical History:   Diagnosis Date   ? Abnormal Papanicolaou smear of cervix with positive human papilloma virus (HPV) test 2015   ? Amblyopia, right eye     patched at age 5.   ? " Chronic bronchitis (H)    ? Essential hypertension 2014   ? Hypothyroidism 2009   ? Osteoarthritis    ? Primary cancer of left upper lobe of lung (H) 2019   ? Shingles    ? Vulvar intraepithelial neoplasia III (BONNIE III) 2016     PSH:  Past Surgical History:   Procedure Laterality Date   ?  SECTION      x2   ? CHOLECYSTECTOMY  1985   ? CYST REMOVAL  2017    Sebaceous cyst excision from the back.   ? LAPAROSCOPIC APPENDECTOMY  2018    For acute appendicitis.   ? MICRODISCECTOMY LUMBAR Right 10/28/2003    Right L5-S1 micro discectomy.   ? MICRODISCECTOMY LUMBAR Right 2004    Lumbar reexploration and a right L5-S1 microdiscectomy.   ? THORACOTOMY Left 2019    with left upper lobe wedge resection and left upper lobectomy, mediastinal lymphadenectomy   ? TOTAL HIP ARTHROPLASTY Left 10/02/2012   ? VAGINA SURGERY  2006    Tension-free vaginal tape.  Cystoscopy.  For stress urinary incontinence with intrinsic sphincter deficiency.   ? VULVA SURGERY Right 2016    Right vulvar wide local excision.      Allergies:  No Known Allergies    Family HX:  Family History   Problem Relation Age of Onset   ? COPD Mother    ? Bone cancer Paternal Grandmother    ? Cancer Paternal Uncle         of the eye.   ? Parkinsonism Father    ? Diabetes type II Father    ? No Medical Problems Brother    ? No Medical Problems Son    ? No Medical Problems Son    ? No Medical Problems Son    ? No Medical Problems Daughter    ? No Medical Problems Daughter    ? Arthritis Brother    ? No Medical Problems Brother    ? No Medical Problems Brother      Social Hx:  Social History     Socioeconomic History   ? Marital status: Single     Spouse name: Not on file   ? Number of children: Not on file   ? Years of education: Not on file   ? Highest education level: Not on file   Occupational History   ? Occupation: Retired.     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   Social Needs   ? Financial  "resource strain: Not on file   ? Food insecurity:     Worry: Not on file     Inability: Not on file   ? Transportation needs:     Medical: Not on file     Non-medical: Not on file   Tobacco Use   ? Smoking status: Current Some Day Smoker     Packs/day: 0.25     Years: 52.00     Pack years: 13.00     Types: Cigarettes   ? Smokeless tobacco: Never Used   Substance and Sexual Activity   ? Alcohol use: Not Currently     Frequency: Never   ? Drug use: Never   ? Sexual activity: Yes     Partners: Male   Lifestyle   ? Physical activity:     Days per week: Not on file     Minutes per session: Not on file   ? Stress: Not on file   Relationships   ? Social connections:     Talks on phone: Not on file     Gets together: Not on file     Attends Congregational service: Not on file     Active member of club or organization: Not on file     Attends meetings of clubs or organizations: Not on file     Relationship status: Not on file   ? Intimate partner violence:     Fear of current or ex partner: Not on file     Emotionally abused: Not on file     Physically abused: Not on file     Forced sexual activity: Not on file   Other Topics Concern   ? Not on file   Social History Narrative   ? Not on file   Tobacco: 1/2 -  1 PPD x 50 years, currently 1/2 PPD  Worked as a , retired now  5 children, 2 children have asthma    Physical Exam:  /72   Pulse (!) 106   Resp 24   Ht 5' 4.75\" (1.645 m)   Wt 176 lb 14.4 oz (80.2 kg)   SpO2 97% Comment: RA  BMI 29.67 kg/m     Gen: Alert, oriented, no distress  HEENT: nasal turbinates are very inflamed and narrowed, no oropharyngeal lesions, no cervical or supraclavicular lymphadenopathy  CV: RRR, no M/G/R  Resp: CTAB, no focal crackles or wheezes  Abd: soft, nontender, no palpable organomegaly  Skin: no apparent rashes  Ext: no cyanosis, clubbing or edema  Neuro: alert, nonfocal    Labs:  Reviewed    Imaging studies:  Personally reviewed and interpreted:    9/24/19 Screening " CT:  FINDINGS:  LUNGS AND PLEURA: Spiculated solid nodule in the left upper lobe measuring 10 x 6 mm (series 13 image 25). Additional tiny pulmonary nodules, including a 3 mm solid nodule in the right upper lobe (series 13 image 19), a 2 mm solid nodule in the right upper lobe (image 18), and a 2 mm solid nodule in the left lower lobe (image 79). No pleural effusions. Mild centrilobular emphysema.    MEDIASTINUM: No enlarged thoracic lymph nodes. Normal heart size. Normal caliber thoracic aorta. No pericardial effusion.    CORONARY ARTERY CALCIFICATION: None    LIMITED UPPER ABDOMEN: Status post cholecystectomy.    MUSCULOSKELETAL: Negative.    CONCLUSION:  1. There are a few solid pulmonary nodules, the largest in the left upper lobe measuring 10 x 6 mm.    PFT's   11/4/19 [facility]  FEV1/FVC 57, FEV1 66 FVC 90  Neg BD response  ,   DLCO 72     Moderate obstruction, mild diffusion impairment

## 2021-06-20 NOTE — LETTER
Letter by Shonna Godwin RN at      Author: Shonna Godwin RN Service: -- Author Type: --    Filed:  Encounter Date: 1/3/2020 Status: Signed       Dear Mary Lang    Thank you for choosing [facility] for your care.  We are committed to providing you with the highest quality and compassionate healthcare services.  The following information pertains to your first appointment with our clinic.    Date/Time of appointment:  Wednesday, January 15th 2020 at 12:30 pm.      Note: Please arrive at 12:30 pm.  This allows time to complete forms, possible labs and nursing assessment.    Name of your Physician:  Citlali Gerber MD    What to bring to your appointment:    Completed Patient History/Initial Nursing Assessment and Medication/Allergy List (these forms were sent to you).    Any paperwork or films from your physician that we have asked you to bring.    Your current insurance card(s).    Parking:    Please refer to the map included to direct you to [facility].    You can park in any visitor/patient parking area you choose. There is no charge for parking at [facility].     Enter the hospital at the front/main entrance.      Please check in with our  representatives who will escort you to the clinic located in [address] of the [facility].    We hope these instructions are helpful to you.  If you have any questions or concerns, please call us at [phone].  It is our pleasure to assist you.    Warm Regards,  Shonna Godwin  Nurse Navigator  [phone]

## 2021-06-26 ENCOUNTER — HEALTH MAINTENANCE LETTER (OUTPATIENT)
Age: 66
End: 2021-06-26

## 2021-07-03 NOTE — ADDENDUM NOTE
Addendum Note by Cristobal Burgess MD at 1/13/2020 12:07 PM     Author: Cristobal Burgess MD Service: -- Author Type: Physician    Filed: 1/13/2020 12:07 PM Encounter Date: 1/13/2020 Status: Signed    : Cristobal Burgess MD (Physician)    Addended by: CRISTOBAL BURGESS on: 1/13/2020 12:07 PM        Modules accepted: Orders

## 2021-07-13 ENCOUNTER — RECORDS - HEALTHEAST (OUTPATIENT)
Dept: ADMINISTRATIVE | Facility: CLINIC | Age: 66
End: 2021-07-13

## 2021-10-16 ENCOUNTER — HEALTH MAINTENANCE LETTER (OUTPATIENT)
Age: 66
End: 2021-10-16

## 2021-12-11 ENCOUNTER — HEALTH MAINTENANCE LETTER (OUTPATIENT)
Age: 66
End: 2021-12-11

## 2022-09-25 ENCOUNTER — HEALTH MAINTENANCE LETTER (OUTPATIENT)
Age: 67
End: 2022-09-25

## 2023-02-04 ENCOUNTER — HEALTH MAINTENANCE LETTER (OUTPATIENT)
Age: 68
End: 2023-02-04

## 2023-05-13 ENCOUNTER — HEALTH MAINTENANCE LETTER (OUTPATIENT)
Age: 68
End: 2023-05-13

## 2024-03-02 ENCOUNTER — HEALTH MAINTENANCE LETTER (OUTPATIENT)
Age: 69
End: 2024-03-02